# Patient Record
Sex: MALE | Race: WHITE | Employment: FULL TIME | URBAN - NONMETROPOLITAN AREA
[De-identification: names, ages, dates, MRNs, and addresses within clinical notes are randomized per-mention and may not be internally consistent; named-entity substitution may affect disease eponyms.]

---

## 2017-02-07 ENCOUNTER — OFFICE VISIT (OUTPATIENT)
Dept: UROLOGY | Age: 42
End: 2017-02-07

## 2017-02-07 VITALS
WEIGHT: 295 LBS | BODY MASS INDEX: 39.1 KG/M2 | SYSTOLIC BLOOD PRESSURE: 146 MMHG | HEIGHT: 73 IN | DIASTOLIC BLOOD PRESSURE: 88 MMHG

## 2017-02-07 DIAGNOSIS — N20.0 NEPHROLITHIASIS: Primary | ICD-10-CM

## 2017-02-07 LAB
BILIRUBIN URINE: NEGATIVE
BLOOD URINE, POC: NORMAL
CHARACTER, URINE: CLEAR
COLOR, URINE: YELLOW
GLUCOSE URINE: NEGATIVE MG/DL
KETONES, URINE: NEGATIVE
LEUKOCYTE CLUMPS, URINE: NEGATIVE
NITRITE, URINE: NEGATIVE
PH, URINE: 6
PROTEIN, URINE: NEGATIVE MG/DL
SPECIFIC GRAVITY, URINE: 1.02 (ref 1–1.03)
UROBILINOGEN, URINE: 0.2 EU/DL

## 2017-02-07 PROCEDURE — 81003 URINALYSIS AUTO W/O SCOPE: CPT | Performed by: NURSE PRACTITIONER

## 2017-02-07 PROCEDURE — 99203 OFFICE O/P NEW LOW 30 MIN: CPT | Performed by: NURSE PRACTITIONER

## 2017-02-10 LAB — STONE ANALYSIS: NORMAL

## 2017-11-09 ENCOUNTER — OFFICE VISIT (OUTPATIENT)
Dept: CARDIOLOGY CLINIC | Age: 42
End: 2017-11-09
Payer: COMMERCIAL

## 2017-11-09 VITALS
BODY MASS INDEX: 40.42 KG/M2 | DIASTOLIC BLOOD PRESSURE: 88 MMHG | SYSTOLIC BLOOD PRESSURE: 140 MMHG | HEIGHT: 73 IN | WEIGHT: 305 LBS | HEART RATE: 87 BPM

## 2017-11-09 DIAGNOSIS — I48.20 CHRONIC ATRIAL FIBRILLATION (HCC): Primary | ICD-10-CM

## 2017-11-09 DIAGNOSIS — I48.0 PAF (PAROXYSMAL ATRIAL FIBRILLATION) (HCC): ICD-10-CM

## 2017-11-09 PROCEDURE — 99213 OFFICE O/P EST LOW 20 MIN: CPT | Performed by: INTERNAL MEDICINE

## 2017-11-09 PROCEDURE — 93000 ELECTROCARDIOGRAM COMPLETE: CPT | Performed by: INTERNAL MEDICINE

## 2017-11-09 RX ORDER — DILTIAZEM HYDROCHLORIDE 120 MG/1
120 CAPSULE, COATED, EXTENDED RELEASE ORAL 2 TIMES DAILY
Qty: 60 CAPSULE | Refills: 11 | Status: SHIPPED | OUTPATIENT
Start: 2017-11-09 | End: 2019-04-03 | Stop reason: SDUPTHER

## 2017-11-09 RX ORDER — DABIGATRAN ETEXILATE 150 MG/1
150 CAPSULE, COATED PELLETS ORAL 2 TIMES DAILY
Qty: 60 CAPSULE | Refills: 11 | Status: SHIPPED | OUTPATIENT
Start: 2017-11-09 | End: 2018-10-02

## 2017-11-09 NOTE — PROGRESS NOTES
weakness  Are negative   HE ENT negative  HEART all negative  LUNGS all negative   ABDOMEN all negative  GENITAL URINARY all within normal limits  NEUROLOGY all negative  NECK all negative   SKIN all negative  MUSCULOSKELETAL negative  HEMATOLOGICAL negative  PSYCHIATRIC  negative    Vitals:    11/09/17 0956   BP: (!) 148/89   Pulse: 87   Weight: (!) 305 lb (138.3 kg)   Height: 6' 1\" (1.854 m)       EXAMINATION   Gen.  Appearance is reflective of nutritional normal nutrition and well-groomed   Appears  stated age    Carotid the amplitude of  carotid artery  And up stroke are present or diminished and bruits are absent   Thyroid palpation appears to be  Normal    [place]  And evaluated and within normal limits  And size of pupils is normal  HEENT  teeth appears to be symmetrical without poor dentition and gums  and palate appears to be healthy and  head is normal cephalic  Without signs of trauma and eyes are without trauma no conjunctiva and Iids retracting and not retracted ptosis and no ptosis and without  erythematous changes and  Nose is symmetrical  without drainage  and ears are  without tinnitus  and throat is clear   JUGULAR VEINS  are not elevated and tongue is mid line no masses presence and no lee A waves present   LYMPHATICS are without adenopathies at least on exam   CHEST  No biventricular heaves and thrills and no right ventricular heaves and examination without signs of trauma and lesions  HEART not distant and regular rate and rhythm without   gallop signs and and no murmurs and systolic crescendo  Decrescendo  normal s1 and s2 sounds and no s3 and s4 split   Point of maximum intensity of the heartbeat  is at or displaced from the fifth intercostal space  LUNGS no   presence of intercostal retractions and no  use of the accessory muscles with a diaphragmatic movement present and not present pectus and pigeon chest and without wheezes and rhonchi and non diminished in all posterior lungs  and without rales  ABDOMEN abdominal bruit not present  And  No  Presency of  morbid obesity and with no    non distended without organomegaly and no rebound tenderness and bowel sound are present  all quadrants   NEUROLOGY all the cranial nerves are intact and no motor deficits  and no sensory deficits  MUSCULAR SKELETAL without signs of trauma and kyphosis and scoliosis and normal range of motion for all extremities  INTEGUMENTARY without tenting and skin rashes indentation and  dryness  NECK without lymph adenopathy   NEUROPSYCHIATRIC has no anxiety, no mood swings and depression  VASCULAR EXAM for carotid ,radial femoral arteries are  steady  and not diminished   Extremities no evidence of peripheral edema  And pulses are  normal    Assessment and plans  1.  atrial fibrillation (HCC) in sinus  EKG 12 Lead   2. PAF (paroxysmal atrial fibrillation) (HCC) in sinus       ekg sinus no acute process     Patient is on anticoagulation and has accepted the potential risk of bleeding to hopefull decrease risk of embolic stroke. We Re assured  And educated the  patient  On their  medical status  And the treatment plans  And the patient  is willing  to be complaint with care  And follow up and  All their question  answered to their  Satisfaction     patient was advised of the side effects of the medications and watch for any change in medical status if any of those side effects develop to call immediately and may consider  discontinuing the medicine after talking to us and  depending on the clinical scenario    Patient was advised to return in 12 months for follow up or sooner if there is any change in care.           Electronically signed by Denise Malagon DO on 11/9/2017 at 10:33 AM

## 2017-12-24 ENCOUNTER — HOSPITAL ENCOUNTER (EMERGENCY)
Age: 42
Discharge: HOME | End: 2017-12-24
Payer: COMMERCIAL

## 2017-12-24 ENCOUNTER — APPOINTMENT (OUTPATIENT)
Dept: CT IMAGING | Age: 42
End: 2017-12-24
Payer: COMMERCIAL

## 2017-12-24 ENCOUNTER — APPOINTMENT (OUTPATIENT)
Dept: GENERAL RADIOLOGY | Age: 42
End: 2017-12-24
Payer: COMMERCIAL

## 2017-12-24 VITALS
HEIGHT: 73 IN | BODY MASS INDEX: 38.43 KG/M2 | OXYGEN SATURATION: 98 % | HEART RATE: 77 BPM | WEIGHT: 290 LBS | SYSTOLIC BLOOD PRESSURE: 125 MMHG | DIASTOLIC BLOOD PRESSURE: 74 MMHG | RESPIRATION RATE: 16 BRPM | TEMPERATURE: 97.7 F

## 2017-12-24 DIAGNOSIS — K80.50 BILIARY COLIC: Primary | ICD-10-CM

## 2017-12-24 LAB
ALBUMIN SERPL-MCNC: 4.3 G/DL (ref 3.5–5.1)
ALP BLD-CCNC: 75 U/L (ref 38–126)
ALT SERPL-CCNC: 50 U/L (ref 11–66)
ANION GAP SERPL CALCULATED.3IONS-SCNC: 15 MEQ/L (ref 8–16)
AST SERPL-CCNC: 27 U/L (ref 5–40)
BASOPHILS # BLD: 0.5 %
BASOPHILS ABSOLUTE: 0.1 THOU/MM3 (ref 0–0.1)
BILIRUB SERPL-MCNC: 0.3 MG/DL (ref 0.3–1.2)
BILIRUBIN DIRECT: < 0.2 MG/DL (ref 0–0.3)
BUN BLDV-MCNC: 20 MG/DL (ref 7–22)
CALCIUM SERPL-MCNC: 9.9 MG/DL (ref 8.5–10.5)
CHLORIDE BLD-SCNC: 103 MEQ/L (ref 98–111)
CO2: 25 MEQ/L (ref 23–33)
CREAT SERPL-MCNC: 1.3 MG/DL (ref 0.4–1.2)
EKG ATRIAL RATE: 73 BPM
EKG P AXIS: 50 DEGREES
EKG P-R INTERVAL: 142 MS
EKG Q-T INTERVAL: 360 MS
EKG QRS DURATION: 80 MS
EKG QTC CALCULATION (BAZETT): 396 MS
EKG R AXIS: 54 DEGREES
EKG T AXIS: 33 DEGREES
EKG VENTRICULAR RATE: 73 BPM
EOSINOPHIL # BLD: 1.1 %
EOSINOPHILS ABSOLUTE: 0.1 THOU/MM3 (ref 0–0.4)
FLU A ANTIGEN: NEGATIVE
FLU B ANTIGEN: NEGATIVE
GFR SERPL CREATININE-BSD FRML MDRD: 60 ML/MIN/1.73M2
GLUCOSE BLD-MCNC: 147 MG/DL (ref 70–108)
HCT VFR BLD CALC: 50.6 % (ref 42–52)
HEMOGLOBIN: 17.3 GM/DL (ref 14–18)
LIPASE: 22.3 U/L (ref 5.6–51.3)
LYMPHOCYTES # BLD: 15.8 %
LYMPHOCYTES ABSOLUTE: 1.8 THOU/MM3 (ref 1–4.8)
MCH RBC QN AUTO: 32.2 PG (ref 27–31)
MCHC RBC AUTO-ENTMCNC: 34.1 GM/DL (ref 33–37)
MCV RBC AUTO: 94.4 FL (ref 80–94)
MONOCYTES # BLD: 5.8 %
MONOCYTES ABSOLUTE: 0.6 THOU/MM3 (ref 0.4–1.3)
NUCLEATED RED BLOOD CELLS: 0 /100 WBC
OSMOLALITY CALCULATION: 290.3 MOSMOL/KG (ref 275–300)
PDW BLD-RTO: 14.1 % (ref 11.5–14.5)
PLATELET # BLD: 246 THOU/MM3 (ref 130–400)
PMV BLD AUTO: 8.8 MCM (ref 7.4–10.4)
POTASSIUM SERPL-SCNC: 4.9 MEQ/L (ref 3.5–5.2)
RBC # BLD: 5.36 MILL/MM3 (ref 4.7–6.1)
SEG NEUTROPHILS: 76.8 %
SEGMENTED NEUTROPHILS ABSOLUTE COUNT: 8.6 THOU/MM3 (ref 1.8–7.7)
SODIUM BLD-SCNC: 143 MEQ/L (ref 135–145)
TOTAL PROTEIN: 7.3 G/DL (ref 6.1–8)
TROPONIN T: < 0.01 NG/ML
WBC # BLD: 11.2 THOU/MM3 (ref 4.8–10.8)

## 2017-12-24 PROCEDURE — 6360000004 HC RX CONTRAST MEDICATION: Performed by: NURSE PRACTITIONER

## 2017-12-24 PROCEDURE — 96361 HYDRATE IV INFUSION ADD-ON: CPT

## 2017-12-24 PROCEDURE — 2580000003 HC RX 258: Performed by: NURSE PRACTITIONER

## 2017-12-24 PROCEDURE — 74177 CT ABD & PELVIS W/CONTRAST: CPT

## 2017-12-24 PROCEDURE — 71010 XR CHEST PORTABLE: CPT

## 2017-12-24 PROCEDURE — 87804 INFLUENZA ASSAY W/OPTIC: CPT

## 2017-12-24 PROCEDURE — 84484 ASSAY OF TROPONIN QUANT: CPT

## 2017-12-24 PROCEDURE — 93005 ELECTROCARDIOGRAM TRACING: CPT

## 2017-12-24 PROCEDURE — 6370000000 HC RX 637 (ALT 250 FOR IP): Performed by: NURSE PRACTITIONER

## 2017-12-24 PROCEDURE — 96374 THER/PROPH/DIAG INJ IV PUSH: CPT

## 2017-12-24 PROCEDURE — 6360000002 HC RX W HCPCS: Performed by: NURSE PRACTITIONER

## 2017-12-24 PROCEDURE — 6360000002 HC RX W HCPCS

## 2017-12-24 PROCEDURE — 85025 COMPLETE CBC W/AUTO DIFF WBC: CPT

## 2017-12-24 PROCEDURE — 82248 BILIRUBIN DIRECT: CPT

## 2017-12-24 PROCEDURE — 80053 COMPREHEN METABOLIC PANEL: CPT

## 2017-12-24 PROCEDURE — 36415 COLL VENOUS BLD VENIPUNCTURE: CPT

## 2017-12-24 PROCEDURE — 96375 TX/PRO/DX INJ NEW DRUG ADDON: CPT

## 2017-12-24 PROCEDURE — 99285 EMERGENCY DEPT VISIT HI MDM: CPT

## 2017-12-24 PROCEDURE — 83690 ASSAY OF LIPASE: CPT

## 2017-12-24 RX ORDER — KETOROLAC TROMETHAMINE 30 MG/ML
INJECTION, SOLUTION INTRAMUSCULAR; INTRAVENOUS
Status: COMPLETED
Start: 2017-12-24 | End: 2017-12-24

## 2017-12-24 RX ORDER — KETOROLAC TROMETHAMINE 30 MG/ML
30 INJECTION, SOLUTION INTRAMUSCULAR; INTRAVENOUS ONCE
Status: DISCONTINUED | OUTPATIENT
Start: 2017-12-24 | End: 2017-12-24

## 2017-12-24 RX ORDER — ONDANSETRON 2 MG/ML
4 INJECTION INTRAMUSCULAR; INTRAVENOUS ONCE
Status: COMPLETED | OUTPATIENT
Start: 2017-12-24 | End: 2017-12-24

## 2017-12-24 RX ORDER — MORPHINE SULFATE 2 MG/ML
4 INJECTION, SOLUTION INTRAMUSCULAR; INTRAVENOUS ONCE
Status: COMPLETED | OUTPATIENT
Start: 2017-12-24 | End: 2017-12-24

## 2017-12-24 RX ORDER — 0.9 % SODIUM CHLORIDE 0.9 %
1000 INTRAVENOUS SOLUTION INTRAVENOUS ONCE
Status: COMPLETED | OUTPATIENT
Start: 2017-12-24 | End: 2017-12-24

## 2017-12-24 RX ORDER — KETOROLAC TROMETHAMINE 30 MG/ML
30 INJECTION, SOLUTION INTRAMUSCULAR; INTRAVENOUS ONCE
Status: COMPLETED | OUTPATIENT
Start: 2017-12-24 | End: 2017-12-24

## 2017-12-24 RX ORDER — KETOROLAC TROMETHAMINE 10 MG/1
10 TABLET, FILM COATED ORAL EVERY 6 HOURS PRN
Qty: 20 TABLET | Refills: 0 | Status: SHIPPED | OUTPATIENT
Start: 2017-12-24 | End: 2018-09-25

## 2017-12-24 RX ADMIN — KETOROLAC TROMETHAMINE 30 MG: 30 INJECTION, SOLUTION INTRAMUSCULAR at 07:16

## 2017-12-24 RX ADMIN — ONDANSETRON 4 MG: 2 INJECTION INTRAMUSCULAR; INTRAVENOUS at 06:27

## 2017-12-24 RX ADMIN — IOPAMIDOL 80 ML: 755 INJECTION, SOLUTION INTRAVENOUS at 07:50

## 2017-12-24 RX ADMIN — KETOROLAC TROMETHAMINE 30 MG: 30 INJECTION, SOLUTION INTRAMUSCULAR; INTRAVENOUS at 07:16

## 2017-12-24 RX ADMIN — MORPHINE SULFATE 4 MG: 2 INJECTION, SOLUTION INTRAMUSCULAR; INTRAVENOUS at 06:27

## 2017-12-24 RX ADMIN — Medication 15 ML: at 06:27

## 2017-12-24 RX ADMIN — SODIUM CHLORIDE 1000 ML: 9 INJECTION, SOLUTION INTRAVENOUS at 06:31

## 2017-12-24 ASSESSMENT — ENCOUNTER SYMPTOMS
NAUSEA: 0
RHINORRHEA: 0
SHORTNESS OF BREATH: 0
BACK PAIN: 1
WHEEZING: 0
SORE THROAT: 0
VOICE CHANGE: 0
ABDOMINAL DISTENTION: 0
DIARRHEA: 0
COUGH: 0
ABDOMINAL PAIN: 1
EYE REDNESS: 0
PHOTOPHOBIA: 0
BLOOD IN STOOL: 0
VOMITING: 0
SINUS PRESSURE: 0
COLOR CHANGE: 0
CHEST TIGHTNESS: 0
CONSTIPATION: 0

## 2017-12-24 ASSESSMENT — PAIN DESCRIPTION - ONSET: ONSET: ON-GOING

## 2017-12-24 ASSESSMENT — PAIN SCALES - GENERAL
PAINLEVEL_OUTOF10: 10
PAINLEVEL_OUTOF10: 9
PAINLEVEL_OUTOF10: 10

## 2017-12-24 ASSESSMENT — PAIN DESCRIPTION - PROGRESSION: CLINICAL_PROGRESSION: NOT CHANGED

## 2017-12-24 ASSESSMENT — PAIN DESCRIPTION - FREQUENCY: FREQUENCY: CONTINUOUS

## 2017-12-24 ASSESSMENT — PAIN DESCRIPTION - ORIENTATION: ORIENTATION: UPPER

## 2017-12-24 ASSESSMENT — PAIN DESCRIPTION - PAIN TYPE: TYPE: ACUTE PAIN

## 2017-12-24 ASSESSMENT — PAIN DESCRIPTION - LOCATION: LOCATION: ABDOMEN

## 2017-12-24 NOTE — ED PROVIDER NOTES
for photophobia, redness and visual disturbance. Respiratory: Negative for cough, chest tightness, shortness of breath and wheezing. Cardiovascular: Positive for chest pain (mild). Negative for palpitations. Gastrointestinal: Positive for abdominal pain (upper). Negative for abdominal distention, blood in stool, constipation, diarrhea, nausea and vomiting. Endocrine: Negative for cold intolerance, heat intolerance, polydipsia, polyphagia and polyuria. Genitourinary: Negative for decreased urine volume, difficulty urinating, dysuria, flank pain and frequency. Musculoskeletal: Positive for back pain (while laying flat, his abdominal pain radiates to his back). Negative for arthralgias, gait problem, joint swelling, neck pain and neck stiffness. Skin: Negative for color change and rash. Allergic/Immunologic: Negative for immunocompromised state. Neurological: Negative for dizziness, tremors, weakness, light-headedness, numbness and headaches. Hematological: Does not bruise/bleed easily. Psychiatric/Behavioral: Negative for behavioral problems, confusion, decreased concentration, hallucinations, self-injury and suicidal ideas. The patient is not nervous/anxious. PAST MEDICAL HISTORY    has a past medical history of Arthritis; Atrial fibrillation ([facility] [address] CAD (coronary artery disease); Chest pain; Heart palpitations; Hypertension; and Nausea & vomiting. SURGICAL HISTORY      has a past surgical history that includes Foot surgery (Right, 2012) and Cardiac surgery (1/14).     CURRENT MEDICATIONS       Discharge Medication List as of 12/24/2017  9:07 AM      CONTINUE these medications which have NOT CHANGED    Details   dabigatran (PRADAXA) 150 MG capsule Take 1 capsule by mouth 2 times daily, Disp-60 capsule, R-11Normal      diltiazem (CARDIZEM CD) 120 MG extended release capsule Take 1 capsule by mouth 2 times daily, Disp-60 capsule, R-11Normal      aspirin 81 MG tablet Take 81 mg by mouth daily.      fish oil-omega-3 fatty acids 1000 MG capsule Take 2 g by mouth 2 times daily. ALLERGIES     has No Known Allergies. FAMILY HISTORY     indicated that his mother is alive. He indicated that his father is . He indicated that his sister is alive. He indicated that only one of his two brothers is alive. family history includes Heart Disease in his father. SOCIAL HISTORY      reports that he has been smoking Cigarettes. He has a 22.00 pack-year smoking history. He has never used smokeless tobacco. He reports that he does not drink alcohol or use drugs. PHYSICAL EXAM     INITIAL VITALS:  height is 6' 1\" (1.854 m) and weight is 290 lb (131.5 kg). His oral temperature is 97.7 °F (36.5 °C). His blood pressure is 125/74 and his pulse is 77. His respiration is 16 and oxygen saturation is 98%. Physical Exam   Constitutional: He is oriented to person, place, and time. He appears well-developed and well-nourished. HENT:   Head: Normocephalic. Right Ear: External ear normal.   Left Ear: External ear normal.   Nose: Nose normal.   Mouth/Throat: Uvula is midline and oropharynx is clear and moist.   Eyes: Conjunctivae and EOM are normal. Pupils are equal, round, and reactive to light. Neck: Normal range of motion. Neck supple. Cardiovascular: Normal rate, regular rhythm, S1 normal, S2 normal, normal heart sounds, intact distal pulses and normal pulses. Exam reveals no decreased pulses. Pulses:       Radial pulses are 2+ on the right side, and 2+ on the left side. Pulmonary/Chest: Effort normal and breath sounds normal. No respiratory distress. He exhibits no tenderness. Abdominal: Soft. Normal appearance and bowel sounds are normal. He exhibits no distension. There is tenderness. Abdominal tenderness is diffuse, but patient is more tender in the upper abdominal regions. Musculoskeletal: Normal range of motion.    Neurological: He is alert and oriented to person, CREATININE 1.3 (*)     All other components within normal limits   GLOMERULAR FILTRATION RATE, ESTIMATED - Abnormal; Notable for the following:     Est, Glom Filt Rate 60 (*)     All other components within normal limits   RAPID INFLUENZA A/B ANTIGENS   HEPATIC FUNCTION PANEL   LIPASE   TROPONIN   OSMOLALITY   ANION GAP       EMERGENCY DEPARTMENT COURSE:   Vitals:    Vitals:    12/24/17 0555 12/24/17 0710 12/24/17 0904   BP: (!) 149/103 (!) 142/93 125/74   Pulse: 76 73 77   Resp: 16 18 16   Temp: 97.7 °F (36.5 °C)     TempSrc: Oral     SpO2: 97% 97% 98%   Weight: 290 lb (131.5 kg)     Height: 6' 1\" (1.854 m)       6:13 AM: Patient was seen and evaluated. MDM    Medications   gi cocktail 15 mL (15 mLs Oral Given 12/24/17 0627)   0.9 % sodium chloride bolus (0 mLs Intravenous Stopped 12/24/17 0925)   morphine injection 4 mg (4 mg Intravenous Given 12/24/17 0627)   ondansetron (ZOFRAN) injection 4 mg (4 mg Intravenous Given 12/24/17 0627)   ketorolac (TORADOL) injection 30 mg (30 mg Intravenous Given 12/24/17 0716)   iopamidol (ISOVUE-370) 76 % injection 80 mL (80 mLs Intravenous Given 12/24/17 0750)     Patient was seen for epigastric abdominal pain. Vital signs were stable. Upon examination, the patient was diaphoretic and had diffused abdominal tenderness. The patient was more tender in the epigastric abdominal region. Patient was given 15mL gi cocktail, morphine and toradol for pain, and zofran for nausea. Patient had relief from these symptoms. Imaging revealed a nonobstructive right nephrolithiasisa and a subtle gallstone in the neck of the gallbladder, but no other acute findings. Patient was discharged home with a toradol prescription, and was told to follow up with Dr. Niya Fisher, General Surgeon, in a week if his symptoms worsen. Patient will return to ED for any new or worsening symptoms. Patient was seen independently by myself.  The patient's final impression and disposition and plan was determined by myself. CRITICAL CARE:   None    CONSULTS:  None    PROCEDURES:  None    FINAL IMPRESSION      1. Biliary colic          DISPOSITION/PLAN   Discharge. I have given the patient strict written and verbal instructions about care at home, follow-up, and signs and symptoms of worsening of condition and they did verbalize understanding. PATIENT REFERRED TO:  [facility]  [address]  [address]  [phone]    Call in 1 week  If symptoms worsen      DISCHARGE MEDICATIONS:  Discharge Medication List as of 12/24/2017  9:07 AM      START taking these medications    Details   ketorolac (TORADOL) 10 MG tablet Take 1 tablet by mouth every 6 hours as needed for Pain, Disp-20 tablet, R-0Print             (Please note that portions of this note were completed with a voice recognition program.  Efforts were made to edit the dictations but occasionally words are mis-transcribed.)    Scribe:  Will Holland 12/24/17 6:14 AM Scribing for and in the presence of Donaldo Lopez CNP. Signed by: Melissa Turner, 12/24/17 9:43 AM    Provider:  I personally performed the services described in the documentation, reviewed and edited the documentation which was dictated to the scribe in my presence, and it accurately records my words and actions.     Donaldo Lopez CNP 12/24/17 9:43 AM    Adelaida Lopez NP  12/24/17 8525

## 2017-12-24 NOTE — ED NOTES
Kerline Watson CNP in room to discuss results and POC with pt. Denies needs. Call light in reach.      Marielena Knight RN  12/24/17 0110

## 2017-12-24 NOTE — ED TRIAGE NOTES
Patient presents to ED with complaint of uppder abdominal pain. Per patient this started last night about 2230 while he was watching TV. Patient states nothing makes it worse or better. States that it feels like he has to burp but cannot. Denies shortness of breath but states he feels like he is hot.

## 2017-12-28 ENCOUNTER — TELEPHONE (OUTPATIENT)
Dept: SURGERY | Age: 42
End: 2017-12-28

## 2017-12-28 NOTE — TELEPHONE ENCOUNTER
LM asking pt to call office to schedule appointment to see Dr. Lissy Jama in f/u to ER visit for biliary colic

## 2018-09-10 ENCOUNTER — HOSPITAL ENCOUNTER (EMERGENCY)
Age: 43
Discharge: HOME | End: 2018-09-10
Attending: EMERGENCY MEDICINE
Payer: COMMERCIAL

## 2018-09-10 ENCOUNTER — APPOINTMENT (OUTPATIENT)
Dept: CT IMAGING | Age: 43
End: 2018-09-10
Payer: COMMERCIAL

## 2018-09-10 VITALS
HEIGHT: 73 IN | HEART RATE: 64 BPM | BODY MASS INDEX: 39.1 KG/M2 | SYSTOLIC BLOOD PRESSURE: 138 MMHG | OXYGEN SATURATION: 97 % | DIASTOLIC BLOOD PRESSURE: 90 MMHG | RESPIRATION RATE: 18 BRPM | WEIGHT: 295 LBS | TEMPERATURE: 97.5 F

## 2018-09-10 DIAGNOSIS — R03.0 ELEVATED BLOOD PRESSURE READING: ICD-10-CM

## 2018-09-10 DIAGNOSIS — K80.50 BILIARY COLIC: Primary | ICD-10-CM

## 2018-09-10 LAB
ALBUMIN SERPL-MCNC: 4.6 G/DL (ref 3.5–5.1)
ALP BLD-CCNC: 79 U/L (ref 38–126)
ALT SERPL-CCNC: 22 U/L (ref 11–66)
ANION GAP SERPL CALCULATED.3IONS-SCNC: 11 MEQ/L (ref 8–16)
APTT: 35.7 SECONDS (ref 22–38)
AST SERPL-CCNC: 18 U/L (ref 5–40)
BASOPHILS # BLD: 0.5 %
BASOPHILS ABSOLUTE: 0.1 THOU/MM3 (ref 0–0.1)
BILIRUB SERPL-MCNC: 0.3 MG/DL (ref 0.3–1.2)
BUN BLDV-MCNC: 21 MG/DL (ref 7–22)
CALCIUM SERPL-MCNC: 9.6 MG/DL (ref 8.5–10.5)
CHLORIDE BLD-SCNC: 104 MEQ/L (ref 98–111)
CO2: 26 MEQ/L (ref 23–33)
CREAT SERPL-MCNC: 1.5 MG/DL (ref 0.4–1.2)
EKG ATRIAL RATE: 72 BPM
EKG P AXIS: 52 DEGREES
EKG P-R INTERVAL: 144 MS
EKG Q-T INTERVAL: 388 MS
EKG QRS DURATION: 82 MS
EKG QTC CALCULATION (BAZETT): 424 MS
EKG R AXIS: 59 DEGREES
EKG T AXIS: 38 DEGREES
EKG VENTRICULAR RATE: 72 BPM
EOSINOPHIL # BLD: 1.6 %
EOSINOPHILS ABSOLUTE: 0.2 THOU/MM3 (ref 0–0.4)
ERYTHROCYTE [DISTWIDTH] IN BLOOD BY AUTOMATED COUNT: 13.2 % (ref 11.5–14.5)
ERYTHROCYTE [DISTWIDTH] IN BLOOD BY AUTOMATED COUNT: 45.1 FL (ref 35–45)
GFR SERPL CREATININE-BSD FRML MDRD: 51 ML/MIN/1.73M2
GLUCOSE BLD-MCNC: 146 MG/DL (ref 70–108)
HCT VFR BLD CALC: 50.9 % (ref 42–52)
HEMOGLOBIN: 17.4 GM/DL (ref 14–18)
IMMATURE GRANS (ABS): 0.04 THOU/MM3 (ref 0–0.07)
IMMATURE GRANULOCYTES: 0.4 %
INR BLD: 0.96 (ref 0.85–1.13)
LIPASE: 25.4 U/L (ref 5.6–51.3)
LYMPHOCYTES # BLD: 17.4 %
LYMPHOCYTES ABSOLUTE: 1.9 THOU/MM3 (ref 1–4.8)
MCH RBC QN AUTO: 32.2 PG (ref 26–33)
MCHC RBC AUTO-ENTMCNC: 34.2 GM/DL (ref 32.2–35.5)
MCV RBC AUTO: 94.1 FL (ref 80–94)
MONOCYTES # BLD: 7.2 %
MONOCYTES ABSOLUTE: 0.8 THOU/MM3 (ref 0.4–1.3)
NUCLEATED RED BLOOD CELLS: 0 /100 WBC
OSMOLALITY CALCULATION: 286.9 MOSMOL/KG (ref 275–300)
PLATELET # BLD: 257 THOU/MM3 (ref 130–400)
PMV BLD AUTO: 10.2 FL (ref 9.4–12.4)
POTASSIUM SERPL-SCNC: 4.6 MEQ/L (ref 3.5–5.2)
PRO-BNP: 38.2 PG/ML (ref 0–450)
RBC # BLD: 5.41 MILL/MM3 (ref 4.7–6.1)
SEG NEUTROPHILS: 72.9 %
SEGMENTED NEUTROPHILS ABSOLUTE COUNT: 8.1 THOU/MM3 (ref 1.8–7.7)
SODIUM BLD-SCNC: 141 MEQ/L (ref 135–145)
TOTAL PROTEIN: 7.3 G/DL (ref 6.1–8)
TROPONIN T: < 0.01 NG/ML
WBC # BLD: 11.1 THOU/MM3 (ref 4.8–10.8)

## 2018-09-10 PROCEDURE — 85610 PROTHROMBIN TIME: CPT

## 2018-09-10 PROCEDURE — 83880 ASSAY OF NATRIURETIC PEPTIDE: CPT

## 2018-09-10 PROCEDURE — 93005 ELECTROCARDIOGRAM TRACING: CPT | Performed by: EMERGENCY MEDICINE

## 2018-09-10 PROCEDURE — 93010 ELECTROCARDIOGRAM REPORT: CPT | Performed by: INTERNAL MEDICINE

## 2018-09-10 PROCEDURE — 36415 COLL VENOUS BLD VENIPUNCTURE: CPT

## 2018-09-10 PROCEDURE — 83690 ASSAY OF LIPASE: CPT

## 2018-09-10 PROCEDURE — 71275 CT ANGIOGRAPHY CHEST: CPT

## 2018-09-10 PROCEDURE — 84484 ASSAY OF TROPONIN QUANT: CPT

## 2018-09-10 PROCEDURE — 74177 CT ABD & PELVIS W/CONTRAST: CPT

## 2018-09-10 PROCEDURE — 2580000003 HC RX 258: Performed by: EMERGENCY MEDICINE

## 2018-09-10 PROCEDURE — 6360000004 HC RX CONTRAST MEDICATION: Performed by: EMERGENCY MEDICINE

## 2018-09-10 PROCEDURE — 85025 COMPLETE CBC W/AUTO DIFF WBC: CPT

## 2018-09-10 PROCEDURE — 96376 TX/PRO/DX INJ SAME DRUG ADON: CPT

## 2018-09-10 PROCEDURE — 85730 THROMBOPLASTIN TIME PARTIAL: CPT

## 2018-09-10 PROCEDURE — 80053 COMPREHEN METABOLIC PANEL: CPT

## 2018-09-10 PROCEDURE — 96374 THER/PROPH/DIAG INJ IV PUSH: CPT

## 2018-09-10 PROCEDURE — 6360000002 HC RX W HCPCS: Performed by: EMERGENCY MEDICINE

## 2018-09-10 PROCEDURE — 99284 EMERGENCY DEPT VISIT MOD MDM: CPT

## 2018-09-10 RX ORDER — MORPHINE SULFATE 2 MG/ML
2 INJECTION, SOLUTION INTRAMUSCULAR; INTRAVENOUS ONCE
Status: COMPLETED | OUTPATIENT
Start: 2018-09-10 | End: 2018-09-10

## 2018-09-10 RX ORDER — 0.9 % SODIUM CHLORIDE 0.9 %
1000 INTRAVENOUS SOLUTION INTRAVENOUS ONCE
Status: COMPLETED | OUTPATIENT
Start: 2018-09-10 | End: 2018-09-10

## 2018-09-10 RX ORDER — HYDROCODONE BITARTRATE AND ACETAMINOPHEN 5; 325 MG/1; MG/1
1 TABLET ORAL EVERY 8 HOURS PRN
Qty: 12 TABLET | Refills: 0 | Status: SHIPPED | OUTPATIENT
Start: 2018-09-10 | End: 2018-09-15

## 2018-09-10 RX ORDER — MORPHINE SULFATE 2 MG/ML
INJECTION, SOLUTION INTRAMUSCULAR; INTRAVENOUS
Status: DISCONTINUED
Start: 2018-09-10 | End: 2018-09-10 | Stop reason: HOSPADM

## 2018-09-10 RX ADMIN — SODIUM CHLORIDE 1000 ML: 9 INJECTION, SOLUTION INTRAVENOUS at 05:55

## 2018-09-10 RX ADMIN — MORPHINE SULFATE 2 MG: 2 INJECTION, SOLUTION INTRAMUSCULAR; INTRAVENOUS at 05:07

## 2018-09-10 RX ADMIN — IOPAMIDOL 80 ML: 755 INJECTION, SOLUTION INTRAVENOUS at 05:18

## 2018-09-10 RX ADMIN — MORPHINE SULFATE 2 MG: 2 INJECTION, SOLUTION INTRAMUSCULAR; INTRAVENOUS at 06:09

## 2018-09-10 ASSESSMENT — PAIN DESCRIPTION - LOCATION: LOCATION: ABDOMEN;BACK

## 2018-09-10 ASSESSMENT — ENCOUNTER SYMPTOMS
BACK PAIN: 1
VOMITING: 0
WHEEZING: 0
NAUSEA: 0
ABDOMINAL PAIN: 1
BLOOD IN STOOL: 0
CONSTIPATION: 0
DIARRHEA: 0
RHINORRHEA: 0
SORE THROAT: 0
COUGH: 0
CHEST TIGHTNESS: 0
SHORTNESS OF BREATH: 0

## 2018-09-10 ASSESSMENT — PAIN SCALES - GENERAL
PAINLEVEL_OUTOF10: 8
PAINLEVEL_OUTOF10: 8
PAINLEVEL_OUTOF10: 6

## 2018-09-10 ASSESSMENT — PAIN DESCRIPTION - FREQUENCY: FREQUENCY: CONTINUOUS

## 2018-09-10 ASSESSMENT — PAIN DESCRIPTION - PAIN TYPE: TYPE: ACUTE PAIN

## 2018-09-10 ASSESSMENT — PAIN DESCRIPTION - DESCRIPTORS: DESCRIPTORS: CONSTANT

## 2018-09-10 NOTE — ED PROVIDER NOTES
Hematological: Negative for adenopathy. Psychiatric/Behavioral: Negative for confusion and suicidal ideas. The patient is not nervous/anxious. PAST MEDICAL HISTORY    has a past medical history of Arthritis; Atrial fibrillation ([address] CAD (coronary artery disease); Chest pain; Heart palpitations; Hypertension; and Nausea & vomiting. SURGICAL HISTORY      has a past surgical history that includes Foot surgery (Right, ) and Cardiac surgery (). CURRENT MEDICATIONS       Discharge Medication List as of 9/10/2018  6:28 AM      CONTINUE these medications which have NOT CHANGED    Details   ketorolac (TORADOL) 10 MG tablet Take 1 tablet by mouth every 6 hours as needed for Pain, Disp-20 tablet, R-0Print      dabigatran (PRADAXA) 150 MG capsule Take 1 capsule by mouth 2 times daily, Disp-60 capsule, R-11Normal      diltiazem (CARDIZEM CD) 120 MG extended release capsule Take 1 capsule by mouth 2 times daily, Disp-60 capsule, R-11Normal      aspirin 81 MG tablet Take 81 mg by mouth daily. fish oil-omega-3 fatty acids 1000 MG capsule Take 2 g by mouth 2 times daily. ALLERGIES     has No Known Allergies. FAMILY HISTORY     indicated that his mother is alive. He indicated that his father is . He indicated that his sister is alive. He indicated that only one of his two brothers is alive. family history includes Heart Disease in his father. SOCIAL HISTORY      reports that he has been smoking Cigarettes. He has a 22.00 pack-year smoking history. He has never used smokeless tobacco. He reports that he does not drink alcohol or use drugs. PHYSICAL EXAM     INITIAL VITALS:  height is 6' 1\" (1.854 m) and weight is 295 lb (133.8 kg). His oral temperature is 97.5 °F (36.4 °C). His blood pressure is 138/90 (abnormal) and his pulse is 64. His respiration is 18 and oxygen saturation is 97%. Physical Exam   Constitutional: He is oriented to person, place, and time.  He ischemic changes. His labs are reviewed which are reassuring. Normal LFT. Normal lipase. His pain is controlled with IV morphine. CT abdomen and pelvis shows distended gallbladder. Previously noted subtle gallstone in the gallbladder neck not appreciated at this time. Consider right upper quadrant ultrasound correlation. Given his normal LFT, Bilirubin and normal alkaline phosphatase, No suspicion this is asending cholangitis. This patient has known history of cholelithiasis, I feel he needs a HIDA scan instead GB ultrasound. He is discharged with [place]. An outpatient HID scan is ordered. CRITICAL CARE:   None    CONSULTS:  None    PROCEDURES:  None    FINAL IMPRESSION      1. Biliary colic    2. Elevated blood pressure reading          DISPOSITION/PLAN   Home    PATIENT REFERRED TO:  [place] [address], [address] [facility]  [address] [facility] [address] [phone]    In 1 week        DISCHARGE MEDICATIONS:  Discharge Medication List as of 9/10/2018  6:28 AM      START taking these medications    Details   HYDROcodone-acetaminophen (NORCO) 5-325 MG per tablet Take 1 tablet by mouth every 8 hours as needed for Pain for up to 5 days. ., Disp-12 tablet, R-0Print             (Please note that portions of this note were completed with a voice recognition program.  Efforts were made to edit the dictations but occasionally words are mis-transcribed.)    Scribe:  Luz Patterson, 9/10/18 8:01 AM Scribing for and in the presence of No att. providers found.     Signed by: Melissa Desouza, 9/10/18 8:01 AM     Provider:  I personally performed the services described in the documentation, reviewed and edited the documentation which was dictated to the scribe in my presence, and it accurately records my words and actions.     MD Gerald Gallegos MD  09/10/18 7063

## 2018-09-25 ENCOUNTER — TELEPHONE (OUTPATIENT)
Dept: SURGERY | Age: 43
End: 2018-09-25

## 2018-09-25 ENCOUNTER — TELEPHONE (OUTPATIENT)
Dept: CARDIOLOGY CLINIC | Age: 43
End: 2018-09-25

## 2018-09-25 ENCOUNTER — OFFICE VISIT (OUTPATIENT)
Dept: SURGERY | Age: 43
End: 2018-09-25
Payer: COMMERCIAL

## 2018-09-25 VITALS
DIASTOLIC BLOOD PRESSURE: 100 MMHG | OXYGEN SATURATION: 98 % | RESPIRATION RATE: 18 BRPM | TEMPERATURE: 97.1 F | BODY MASS INDEX: 37.77 KG/M2 | HEIGHT: 73 IN | WEIGHT: 285 LBS | HEART RATE: 92 BPM | SYSTOLIC BLOOD PRESSURE: 148 MMHG

## 2018-09-25 DIAGNOSIS — K80.50 BILIARY COLIC: Primary | ICD-10-CM

## 2018-09-25 PROCEDURE — 99204 OFFICE O/P NEW MOD 45 MIN: CPT | Performed by: SURGERY

## 2018-09-25 NOTE — TELEPHONE ENCOUNTER
Patient was established patient of Dr De Montalvo and has not seen anyone else yet, but scheduled w/ Dr Shannan Recinos in November that needs to be rescheduled. He is needing to have a pre-op clearance done for Gallbladder surgery with Dr Jacquelin Mallory.   No date yet

## 2018-09-26 ENCOUNTER — TELEPHONE (OUTPATIENT)
Dept: SURGERY | Age: 43
End: 2018-09-26

## 2018-09-26 ASSESSMENT — ENCOUNTER SYMPTOMS
EYE REDNESS: 0
BACK PAIN: 0
ALLERGIC/IMMUNOLOGIC NEGATIVE: 1
RHINORRHEA: 0
EYE PAIN: 0
SHORTNESS OF BREATH: 0
APNEA: 1
SINUS PAIN: 0
COUGH: 0
CHEST TIGHTNESS: 0
PHOTOPHOBIA: 0
ABDOMINAL PAIN: 1
VOICE CHANGE: 0
EYES NEGATIVE: 1
WHEEZING: 0
COLOR CHANGE: 0
FACIAL SWELLING: 0
CHOKING: 0
STRIDOR: 0
SORE THROAT: 0
EYE DISCHARGE: 0
EYE ITCHING: 0
SINUS PRESSURE: 0
TROUBLE SWALLOWING: 0

## 2018-09-26 NOTE — PROGRESS NOTES
[address] [facility] [address] [place]  [place] [address]  Dept: [phone]  Dept Fax: [phone]  Loc: [phone]    Visit Date: 9/25/2018    Roberto Wilks is a 37 y.o. male who presents today for:  Chief Complaint   Patient presents with    Surgical Consult     New Pt. Ref. Benita Chowdhury- Biliary Colic       HPI:     HPI 75-year-old white male somewhat of a difficult historian but basically in December of last year had what sounds like a biliary colic attack and was seen in the emergency room and had a CT scan performed. The reading on that CT was that there was a \"subtle\" gallstone in the neck of the gallbladder. Patient is unclear as to if any recommendation was given for cholecystectomy within the patient denies any other symptoms until recently had another attack. He presented to the emergency room again had a CT scan performed that showed no other significant abnormalities and now the stone that was seen previously could not be seen. Patient initially states they did an ultrasound but on further questioning in search there is no evidence of ultrasound in radiology but I believe the ER physician used the ultrasound machine in the ER and patient states he could not determine definitive gallstones.   Patient has been scheduled for a HIDA scan but also then was sent for my evaluation patient does have a history of coronary artery disease and a history of atrial fibrillation patient does take Pradaxa but apparently quit on his own earlier this year    Past Medical History:   Diagnosis Date    Arthritis     Atrial fibrillation ([address]     CAD (coronary artery disease)     Chest pain     Heart palpitations     Hypertension     Nausea & vomiting     Sleep apnea     wears cpap      Past Surgical History:   Procedure Laterality Date    CARDIAC [place]GERY  1[place] [address] [facility] FOOT SURGERY Right 2012     Family History   Problem and oriented to person, place, and time. Skin: Skin is warm and dry. No rash noted. No erythema. No pallor. Psychiatric: He has a normal mood and affect.  His behavior is normal. Judgment and thought content normal.       Results for orders placed or performed during the hospital encounter of 09/10/18   CBC Auto Differential   Result Value Ref Range    WBC 11.1 (H) 4.8 - 10.8 thou/mm3    RBC 5.41 4.70 - 6.10 mill/mm3    Hemoglobin 17.4 14.0 - 18.0 gm/dl    Hematocrit 50.9 42.0 - 52.0 %    MCV 94.1 (H) 80.0 - 94.0 fL    MCH 32.2 26.0 - 33.0 pg    MCHC 34.2 32.2 - 35.5 gm/dl    RDW-CV 13.2 11.5 - 14.5 %    RDW-SD 45.1 (H) 35.0 - 45.0 fL    Platelets 041 621 - 496 thou/mm3    MPV 10.2 9.4 - 12.4 fL    Seg Neutrophils 72.9 %    Lymphocytes 17.4 %    Monocytes 7.2 %    Eosinophils 1.6 %    Basophils 0.5 %    Immature Granulocytes 0.4 %    Segs Absolute 8.1 (H) 1.8 - 7.7 thou/mm3    Lymphocytes # 1.9 1.0 - 4.8 thou/mm3    Monocytes # 0.8 0.4 - 1.3 thou/mm3    Eosinophils # 0.2 0.0 - 0.4 thou/mm3    Basophils # 0.1 0.0 - 0.1 thou/mm3    Immature Grans (Abs) 0.04 0.00 - 0.07 thou/mm3    nRBC 0 /100 wbc   Lipase   Result Value Ref Range    Lipase 25.4 5.6 - 51.3 U/L   Comprehensive Metabolic Panel   Result Value Ref Range    Glucose 146 (H) 70 - 108 mg/dL    CREATININE 1.5 (H) 0.4 - 1.2 mg/dL    BUN 21 7 - 22 mg/dL    Sodium 141 135 - 145 meq/L    Potassium 4.6 3.5 - 5.2 meq/L    Chloride 104 98 - 111 meq/L    CO2 26 23 - 33 meq/L    Calcium 9.6 8.5 - 10.5 mg/dL    AST 18 5 - 40 U/L    Alkaline Phosphatase 79 38 - 126 U/L    Total Protein 7.3 6.1 - 8.0 g/dL    Alb 4.6 3.5 - 5.1 g/dL    Total Bilirubin 0.3 0.3 - 1.2 mg/dL    ALT 22 11 - 66 U/L   APTT   Result Value Ref Range    aPTT 35.7 22.0 - 38.0 seconds   Protime-INR   Result Value Ref Range    INR 0.96 0.85 - 1.13   Troponin   Result Value Ref Range    Troponin T < 0.010 ng/ml   Brain Natriuretic Peptide   Result Value Ref Range    Pro-BNP 38.2 0.0 - 450.0 pg/mL Anion Gap   Result Value Ref Range    Anion Gap 11.0 8.0 - 16.0 meq/L   Glomerular Filtration Rate, Estimated   Result Value Ref Range    Est, Glom Filt Rate 51 (A) ml/min/1.73m2   Osmolality   Result Value Ref Range    Osmolality Calc 286.9 275.0 - 300 mOsmol/kg   EKG 12 Lead   Result Value Ref Range    Ventricular Rate 72 BPM    Atrial Rate 72 BPM    P-R Interval 144 ms    QRS Duration 82 ms    Q-T Interval 388 ms    QTc Calculation (Bazett) 424 ms    P Axis 52 degrees    R Axis 59 degrees    T Axis 38 degrees       Assessment:     Patient has had what sounds like fairly classic biliary colic attacks we discussed gallbladder disease with the patient and the symptoms including epigastric right upper quadrant pain and nausea exactly what he has. We discussed the CT findings of 1 year ago and reviewed the CT just recently performed. Certainly is possible that he passed the stone which may have caused the above episode we discussed performing a true ultrasound and if negative HIDA scan. However patient would like to proceed with cholecystectomy and I believe that's appropriate we discussed the laparoscopic procedure and the possible need for open including vascular injury and bile duct injury he would like to proceed    Plan:     1. Schedule Jorge for laparoscopic cholecystectomy possible open  2. The risks, benefits and alternatives were discussed with Jorge. He understands and wishes to proceed with surgical intervention. 3. Restrictions discussed with Jorge and he expresses understanding. 4. He is advised to call back directly if there are further questions/concerns, or if his symptoms worsen prior to surgery.             Electronically signed by Jt Lyons MD on 9/26/2018 at 7:25 AM

## 2018-10-01 ENCOUNTER — APPOINTMENT (OUTPATIENT)
Dept: GENERAL RADIOLOGY | Age: 43
End: 2018-10-01
Payer: COMMERCIAL

## 2018-10-01 ENCOUNTER — APPOINTMENT (OUTPATIENT)
Dept: ULTRASOUND IMAGING | Age: 43
End: 2018-10-01
Payer: COMMERCIAL

## 2018-10-01 ENCOUNTER — HOSPITAL ENCOUNTER (EMERGENCY)
Age: 43
Discharge: HOME | End: 2018-10-01
Payer: COMMERCIAL

## 2018-10-01 VITALS
HEART RATE: 77 BPM | WEIGHT: 285 LBS | SYSTOLIC BLOOD PRESSURE: 140 MMHG | DIASTOLIC BLOOD PRESSURE: 84 MMHG | HEIGHT: 73 IN | TEMPERATURE: 98.7 F | RESPIRATION RATE: 14 BRPM | OXYGEN SATURATION: 95 % | BODY MASS INDEX: 37.77 KG/M2

## 2018-10-01 DIAGNOSIS — R10.31 RIGHT LOWER QUADRANT ABDOMINAL PAIN: Primary | ICD-10-CM

## 2018-10-01 DIAGNOSIS — R31.29 OTHER MICROSCOPIC HEMATURIA: ICD-10-CM

## 2018-10-01 DIAGNOSIS — K59.00 CONSTIPATION, UNSPECIFIED CONSTIPATION TYPE: ICD-10-CM

## 2018-10-01 LAB
ALBUMIN SERPL-MCNC: 4.5 G/DL (ref 3.5–5.1)
ALP BLD-CCNC: 77 U/L (ref 38–126)
ALT SERPL-CCNC: 28 U/L (ref 11–66)
ANION GAP SERPL CALCULATED.3IONS-SCNC: 15 MEQ/L (ref 8–16)
AST SERPL-CCNC: 24 U/L (ref 5–40)
BACTERIA: ABNORMAL /HPF
BASOPHILS # BLD: 0.3 %
BASOPHILS ABSOLUTE: 0 THOU/MM3 (ref 0–0.1)
BILIRUB SERPL-MCNC: 0.4 MG/DL (ref 0.3–1.2)
BILIRUBIN DIRECT: < 0.2 MG/DL (ref 0–0.3)
BILIRUBIN URINE: NEGATIVE
BLOOD, URINE: ABNORMAL
BUN BLDV-MCNC: 23 MG/DL (ref 7–22)
C-REACTIVE PROTEIN: 0.47 MG/DL (ref 0–1)
CALCIUM SERPL-MCNC: 9.9 MG/DL (ref 8.5–10.5)
CASTS 2: ABNORMAL /LPF
CASTS UA: ABNORMAL /LPF
CHARACTER, URINE: CLEAR
CHLORIDE BLD-SCNC: 101 MEQ/L (ref 98–111)
CO2: 22 MEQ/L (ref 23–33)
COLOR: YELLOW
CREAT SERPL-MCNC: 1.4 MG/DL (ref 0.4–1.2)
CRYSTALS, UA: ABNORMAL
EKG ATRIAL RATE: 71 BPM
EKG P AXIS: 71 DEGREES
EKG P-R INTERVAL: 130 MS
EKG Q-T INTERVAL: 370 MS
EKG QRS DURATION: 88 MS
EKG QTC CALCULATION (BAZETT): 402 MS
EKG R AXIS: 128 DEGREES
EKG T AXIS: 86 DEGREES
EKG VENTRICULAR RATE: 71 BPM
EOSINOPHIL # BLD: 0 %
EOSINOPHILS ABSOLUTE: 0 THOU/MM3 (ref 0–0.4)
EPITHELIAL CELLS, UA: ABNORMAL /HPF
ERYTHROCYTE [DISTWIDTH] IN BLOOD BY AUTOMATED COUNT: 12.8 % (ref 11.5–14.5)
ERYTHROCYTE [DISTWIDTH] IN BLOOD BY AUTOMATED COUNT: 44.2 FL (ref 35–45)
GFR SERPL CREATININE-BSD FRML MDRD: 55 ML/MIN/1.73M2
GLUCOSE BLD-MCNC: 132 MG/DL (ref 70–108)
GLUCOSE URINE: NEGATIVE MG/DL
HCT VFR BLD CALC: 50.5 % (ref 42–52)
HEMOGLOBIN: 17.4 GM/DL (ref 14–18)
IMMATURE GRANS (ABS): 0.05 THOU/MM3 (ref 0–0.07)
IMMATURE GRANULOCYTES: 0.4 %
KETONES, URINE: NEGATIVE
LEUKOCYTE ESTERASE, URINE: NEGATIVE
LIPASE: 19.8 U/L (ref 5.6–51.3)
LYMPHOCYTES # BLD: 7.4 %
LYMPHOCYTES ABSOLUTE: 1 THOU/MM3 (ref 1–4.8)
MCH RBC QN AUTO: 32.6 PG (ref 26–33)
MCHC RBC AUTO-ENTMCNC: 34.5 GM/DL (ref 32.2–35.5)
MCV RBC AUTO: 94.7 FL (ref 80–94)
MISCELLANEOUS 2: ABNORMAL
MONOCYTES # BLD: 4.6 %
MONOCYTES ABSOLUTE: 0.6 THOU/MM3 (ref 0.4–1.3)
NITRITE, URINE: NEGATIVE
NUCLEATED RED BLOOD CELLS: 0 /100 WBC
OSMOLALITY CALCULATION: 281.2 MOSMOL/KG (ref 275–300)
PH UA: 5.5
PLATELET # BLD: 237 THOU/MM3 (ref 130–400)
PMV BLD AUTO: 9.9 FL (ref 9.4–12.4)
POTASSIUM SERPL-SCNC: 5.3 MEQ/L (ref 3.5–5.2)
PROTEIN UA: NEGATIVE
RBC # BLD: 5.33 MILL/MM3 (ref 4.7–6.1)
RBC URINE: ABNORMAL /HPF
RENAL EPITHELIAL, UA: ABNORMAL
SEG NEUTROPHILS: 87.3 %
SEGMENTED NEUTROPHILS ABSOLUTE COUNT: 12.2 THOU/MM3 (ref 1.8–7.7)
SODIUM BLD-SCNC: 138 MEQ/L (ref 135–145)
SPECIFIC GRAVITY, URINE: 1.02 (ref 1–1.03)
TOTAL PROTEIN: 7.7 G/DL (ref 6.1–8)
UROBILINOGEN, URINE: 0.2 EU/DL
WBC # BLD: 14 THOU/MM3 (ref 4.8–10.8)
WBC UA: ABNORMAL /HPF
YEAST: ABNORMAL

## 2018-10-01 PROCEDURE — 86140 C-REACTIVE PROTEIN: CPT

## 2018-10-01 PROCEDURE — 6360000002 HC RX W HCPCS: Performed by: PHYSICIAN ASSISTANT

## 2018-10-01 PROCEDURE — 76705 ECHO EXAM OF ABDOMEN: CPT

## 2018-10-01 PROCEDURE — 2580000003 HC RX 258: Performed by: PHYSICIAN ASSISTANT

## 2018-10-01 PROCEDURE — 85025 COMPLETE CBC W/AUTO DIFF WBC: CPT

## 2018-10-01 PROCEDURE — 76770 US EXAM ABDO BACK WALL COMP: CPT

## 2018-10-01 PROCEDURE — 99284 EMERGENCY DEPT VISIT MOD MDM: CPT

## 2018-10-01 PROCEDURE — 80053 COMPREHEN METABOLIC PANEL: CPT

## 2018-10-01 PROCEDURE — 81001 URINALYSIS AUTO W/SCOPE: CPT

## 2018-10-01 PROCEDURE — 96375 TX/PRO/DX INJ NEW DRUG ADDON: CPT

## 2018-10-01 PROCEDURE — 36415 COLL VENOUS BLD VENIPUNCTURE: CPT

## 2018-10-01 PROCEDURE — 82248 BILIRUBIN DIRECT: CPT

## 2018-10-01 PROCEDURE — 83690 ASSAY OF LIPASE: CPT

## 2018-10-01 PROCEDURE — 74018 RADEX ABDOMEN 1 VIEW: CPT

## 2018-10-01 PROCEDURE — 93005 ELECTROCARDIOGRAM TRACING: CPT | Performed by: PHYSICIAN ASSISTANT

## 2018-10-01 PROCEDURE — 96374 THER/PROPH/DIAG INJ IV PUSH: CPT

## 2018-10-01 RX ORDER — ONDANSETRON 4 MG/1
4 TABLET, ORALLY DISINTEGRATING ORAL EVERY 8 HOURS PRN
Qty: 10 TABLET | Refills: 0 | Status: SHIPPED | OUTPATIENT
Start: 2018-10-01 | End: 2019-04-03

## 2018-10-01 RX ORDER — ONDANSETRON 2 MG/ML
4 INJECTION INTRAMUSCULAR; INTRAVENOUS ONCE
Status: COMPLETED | OUTPATIENT
Start: 2018-10-01 | End: 2018-10-01

## 2018-10-01 RX ORDER — KETOROLAC TROMETHAMINE 30 MG/ML
30 INJECTION, SOLUTION INTRAMUSCULAR; INTRAVENOUS ONCE
Status: COMPLETED | OUTPATIENT
Start: 2018-10-01 | End: 2018-10-01

## 2018-10-01 RX ORDER — 0.9 % SODIUM CHLORIDE 0.9 %
1000 INTRAVENOUS SOLUTION INTRAVENOUS ONCE
Status: COMPLETED | OUTPATIENT
Start: 2018-10-01 | End: 2018-10-01

## 2018-10-01 RX ORDER — KETOROLAC TROMETHAMINE 10 MG/1
10 TABLET, FILM COATED ORAL EVERY 6 HOURS PRN
Qty: 15 TABLET | Refills: 0 | Status: SHIPPED | OUTPATIENT
Start: 2018-10-01 | End: 2019-04-03

## 2018-10-01 RX ADMIN — SODIUM CHLORIDE 1000 ML: 9 INJECTION, SOLUTION INTRAVENOUS at 17:00

## 2018-10-01 RX ADMIN — ONDANSETRON HYDROCHLORIDE 4 MG: 4 INJECTION, SOLUTION INTRAMUSCULAR; INTRAVENOUS at 17:00

## 2018-10-01 RX ADMIN — KETOROLAC TROMETHAMINE 30 MG: 30 INJECTION, SOLUTION INTRAMUSCULAR at 17:00

## 2018-10-01 ASSESSMENT — ENCOUNTER SYMPTOMS
DIARRHEA: 1
SHORTNESS OF BREATH: 0
NAUSEA: 1
SORE THROAT: 0
PHOTOPHOBIA: 0
BACK PAIN: 0
COUGH: 0
RHINORRHEA: 0
ABDOMINAL PAIN: 1
VOMITING: 1

## 2018-10-01 ASSESSMENT — PAIN SCALES - GENERAL
PAINLEVEL_OUTOF10: 7
PAINLEVEL_OUTOF10: 7

## 2018-10-01 ASSESSMENT — PAIN DESCRIPTION - LOCATION: LOCATION: ABDOMEN

## 2018-10-01 ASSESSMENT — PAIN DESCRIPTION - PAIN TYPE: TYPE: ACUTE PAIN

## 2018-10-01 ASSESSMENT — PAIN DESCRIPTION - DESCRIPTORS: DESCRIPTORS: CRAMPING

## 2018-10-01 ASSESSMENT — PAIN DESCRIPTION - ORIENTATION: ORIENTATION: RIGHT;LOWER;UPPER

## 2018-10-01 ASSESSMENT — PAIN DESCRIPTION - FREQUENCY: FREQUENCY: CONTINUOUS

## 2018-10-01 NOTE — ED PROVIDER NOTES
Negative for polyuria. Genitourinary: Negative for difficulty urinating, dysuria, flank pain and frequency. Musculoskeletal: Negative for back pain and gait problem. Skin: Negative for rash. Neurological: Negative for dizziness, weakness and numbness. Psychiatric/Behavioral: Negative for confusion and sleep disturbance. PAST MEDICAL HISTORY    has a past medical history of Arthritis; Atrial fibrillation ([address] CAD (coronary artery disease); Chest pain; Heart palpitations; Hypertension; Nausea & vomiting; and Sleep apnea. SURGICAL HISTORY      has a past surgical history that includes Foot surgery (Right, ) and Cardiac surgery (). CURRENT MEDICATIONS       Discharge Medication List as of 10/1/2018  7:15 PM      CONTINUE these medications which have NOT CHANGED    Details   diltiazem (CARDIZEM CD) 120 MG extended release capsule Take 1 capsule by mouth 2 times daily, Disp-60 capsule, R-11Normal      dabigatran (PRADAXA) 150 MG capsule Take 1 capsule by mouth 2 times daily, Disp-60 capsule, R-11Normal      aspirin 81 MG tablet Take 81 mg by mouth daily. fish oil-omega-3 fatty acids 1000 MG capsule Take 2 g by mouth 2 times daily. ALLERGIES     has No Known Allergies. FAMILY HISTORY     indicated that his mother is alive. He indicated that his father is . He indicated that his sister is alive. He indicated that only one of his two brothers is alive. family history includes Heart Disease in his father. SOCIAL HISTORY      reports that he has been smoking Cigarettes. He has a 22.00 pack-year smoking history. He has never used smokeless tobacco. He reports that he does not drink alcohol or use drugs. PHYSICAL EXAM     INITIAL VITALS:  height is 6' 1\" (1.854 m) and weight is 285 lb (129.3 kg). His oral temperature is 98.7 °F (37.1 °C). His blood pressure is 140/84 (abnormal) and his pulse is 77. His respiration is 14 and oxygen saturation is 95%. chart in the absence of a cardiologist.    James Prowers. Rate: 71 bpm  AZ interval: 130 ms  QRS duration: 88 ms  QTc: 402 ms  P-R-T axes: 71, 128, 86  NSR. Right axis deviation. No STEMI       RADIOLOGY: non-plain film images(s) such as CT, Ultrasound and MRI are read by the radiologist.  Plain radiographic images are visualized and preliminarily interpreted by the emergency physician unless otherwise stated below. US RENAL COMPLETE   Final Result      Normal kidneys. No hydronephrosis. **This report has been created using voice recognition software. It may contain minor errors which are inherent in voice recognition technology. **       Final report electronically signed by Dr. Elida Gonzalez on 10/1/2018 6:42 PM      [address]   Final Result   No gallstones or sonographic abnormality identified. **This report has been created using voice recognition software. It may contain minor errors which are inherent in voice recognition technology. **      Final report electronically signed by Dr. Elida Gonzalez on 10/1/2018 6:39 PM      XR ABDOMEN (KUB) (SINGLE AP VIEW)   Final Result   Large amount of stool throughout the right colon. Correlate for constipation. **This report has been created using voice recognition software. It may contain minor errors which are inherent in voice recognition technology. **      Final report electronically signed by Dr. Elida Gonzalez on 10/1/2018 3:16 PM          LABS:   Labs Reviewed   CBC WITH AUTO DIFFERENTIAL - Abnormal; Notable for the following:        Result Value    WBC 14.0 (*)     MCV 94.7 (*)     Segs Absolute 12.2 (*)     All other components within normal limits   BASIC METABOLIC PANEL - Abnormal; Notable for the following:     Potassium 5.3 (*)     CO2 22 (*)     Glucose 132 (*)     BUN 23 (*)     CREATININE 1.4 (*)     All other components within normal limits   GLOMERULAR FILTRATION RATE, ESTIMATED - Abnormal; Notable for the following:     Est,

## 2018-10-02 ENCOUNTER — APPOINTMENT (OUTPATIENT)
Dept: ULTRASOUND IMAGING | Age: 43
End: 2018-10-02
Payer: COMMERCIAL

## 2018-10-02 ENCOUNTER — HOSPITAL ENCOUNTER (EMERGENCY)
Age: 43
Discharge: HOME | End: 2018-10-02
Attending: EMERGENCY MEDICINE
Payer: COMMERCIAL

## 2018-10-02 VITALS
OXYGEN SATURATION: 98 % | WEIGHT: 285 LBS | BODY MASS INDEX: 37.77 KG/M2 | TEMPERATURE: 98.4 F | DIASTOLIC BLOOD PRESSURE: 88 MMHG | HEART RATE: 94 BPM | SYSTOLIC BLOOD PRESSURE: 142 MMHG | HEIGHT: 73 IN | RESPIRATION RATE: 18 BRPM

## 2018-10-02 DIAGNOSIS — R10.31 ABDOMINAL PAIN, RIGHT LOWER QUADRANT: Primary | ICD-10-CM

## 2018-10-02 DIAGNOSIS — N17.9 ACUTE KIDNEY INJURY (HCC): ICD-10-CM

## 2018-10-02 DIAGNOSIS — R31.29 MICROSCOPIC HEMATURIA: ICD-10-CM

## 2018-10-02 LAB
ALBUMIN SERPL-MCNC: 4.1 G/DL (ref 3.5–5.1)
ALP BLD-CCNC: 67 U/L (ref 38–126)
ALT SERPL-CCNC: 21 U/L (ref 11–66)
ANION GAP SERPL CALCULATED.3IONS-SCNC: 13 MEQ/L (ref 8–16)
AST SERPL-CCNC: 20 U/L (ref 5–40)
BACTERIA: ABNORMAL /HPF
BASOPHILS # BLD: 0.4 %
BASOPHILS ABSOLUTE: 0.1 THOU/MM3 (ref 0–0.1)
BILIRUB SERPL-MCNC: 0.7 MG/DL (ref 0.3–1.2)
BILIRUBIN URINE: NEGATIVE
BLOOD, URINE: ABNORMAL
BUN BLDV-MCNC: 24 MG/DL (ref 7–22)
CALCIUM SERPL-MCNC: 9.2 MG/DL (ref 8.5–10.5)
CASTS 2: ABNORMAL /LPF
CASTS UA: ABNORMAL /LPF
CHARACTER, URINE: CLEAR
CHLORIDE BLD-SCNC: 100 MEQ/L (ref 98–111)
CO2: 24 MEQ/L (ref 23–33)
COLOR: YELLOW
CREAT SERPL-MCNC: 1.8 MG/DL (ref 0.4–1.2)
CRYSTALS, UA: ABNORMAL
EOSINOPHIL # BLD: 0.2 %
EOSINOPHILS ABSOLUTE: 0 THOU/MM3 (ref 0–0.4)
EPITHELIAL CELLS, UA: ABNORMAL /HPF
ERYTHROCYTE [DISTWIDTH] IN BLOOD BY AUTOMATED COUNT: 12.8 % (ref 11.5–14.5)
ERYTHROCYTE [DISTWIDTH] IN BLOOD BY AUTOMATED COUNT: 45.1 FL (ref 35–45)
GFR SERPL CREATININE-BSD FRML MDRD: 41 ML/MIN/1.73M2
GLUCOSE BLD-MCNC: 105 MG/DL (ref 70–108)
GLUCOSE URINE: NEGATIVE MG/DL
HCT VFR BLD CALC: 44.8 % (ref 42–52)
HEMOGLOBIN: 15.1 GM/DL (ref 14–18)
IMMATURE GRANS (ABS): 0.05 THOU/MM3 (ref 0–0.07)
IMMATURE GRANULOCYTES: 0.4 %
KETONES, URINE: NEGATIVE
LEUKOCYTE ESTERASE, URINE: NEGATIVE
LIPASE: 14 U/L (ref 5.6–51.3)
LYMPHOCYTES # BLD: 10.7 %
LYMPHOCYTES ABSOLUTE: 1.5 THOU/MM3 (ref 1–4.8)
MCH RBC QN AUTO: 32.1 PG (ref 26–33)
MCHC RBC AUTO-ENTMCNC: 33.7 GM/DL (ref 32.2–35.5)
MCV RBC AUTO: 95.1 FL (ref 80–94)
MISCELLANEOUS 2: ABNORMAL
MONOCYTES # BLD: 8.2 %
MONOCYTES ABSOLUTE: 1.1 THOU/MM3 (ref 0.4–1.3)
NITRITE, URINE: NEGATIVE
NUCLEATED RED BLOOD CELLS: 0 /100 WBC
OSMOLALITY CALCULATION: 278.2 MOSMOL/KG (ref 275–300)
PH UA: 5
PLATELET # BLD: 203 THOU/MM3 (ref 130–400)
PMV BLD AUTO: 10 FL (ref 9.4–12.4)
POTASSIUM SERPL-SCNC: 4.2 MEQ/L (ref 3.5–5.2)
PROTEIN UA: NEGATIVE
RBC # BLD: 4.71 MILL/MM3 (ref 4.7–6.1)
RBC URINE: ABNORMAL /HPF
RENAL EPITHELIAL, UA: ABNORMAL
SEG NEUTROPHILS: 80.1 %
SEGMENTED NEUTROPHILS ABSOLUTE COUNT: 11.2 THOU/MM3 (ref 1.8–7.7)
SODIUM BLD-SCNC: 137 MEQ/L (ref 135–145)
SPECIFIC GRAVITY, URINE: 1.02 (ref 1–1.03)
TOTAL PROTEIN: 6.7 G/DL (ref 6.1–8)
UROBILINOGEN, URINE: 0.2 EU/DL
WBC # BLD: 14 THOU/MM3 (ref 4.8–10.8)
WBC UA: ABNORMAL /HPF
YEAST: ABNORMAL

## 2018-10-02 PROCEDURE — 76870 US EXAM SCROTUM: CPT

## 2018-10-02 PROCEDURE — 80053 COMPREHEN METABOLIC PANEL: CPT

## 2018-10-02 PROCEDURE — 6360000002 HC RX W HCPCS: Performed by: EMERGENCY MEDICINE

## 2018-10-02 PROCEDURE — 99284 EMERGENCY DEPT VISIT MOD MDM: CPT

## 2018-10-02 PROCEDURE — 83690 ASSAY OF LIPASE: CPT

## 2018-10-02 PROCEDURE — 85025 COMPLETE CBC W/AUTO DIFF WBC: CPT

## 2018-10-02 PROCEDURE — 93010 ELECTROCARDIOGRAM REPORT: CPT | Performed by: INTERNAL MEDICINE

## 2018-10-02 PROCEDURE — 36415 COLL VENOUS BLD VENIPUNCTURE: CPT

## 2018-10-02 PROCEDURE — 81003 URINALYSIS AUTO W/O SCOPE: CPT

## 2018-10-02 PROCEDURE — 76770 US EXAM ABDO BACK WALL COMP: CPT

## 2018-10-02 PROCEDURE — 81001 URINALYSIS AUTO W/SCOPE: CPT

## 2018-10-02 PROCEDURE — 96374 THER/PROPH/DIAG INJ IV PUSH: CPT

## 2018-10-02 RX ORDER — KETOROLAC TROMETHAMINE 30 MG/ML
15 INJECTION, SOLUTION INTRAMUSCULAR; INTRAVENOUS ONCE
Status: COMPLETED | OUTPATIENT
Start: 2018-10-02 | End: 2018-10-02

## 2018-10-02 RX ADMIN — KETOROLAC TROMETHAMINE 15 MG: 30 INJECTION, SOLUTION INTRAMUSCULAR at 20:55

## 2018-10-02 ASSESSMENT — PAIN DESCRIPTION - ONSET: ONSET: ON-GOING

## 2018-10-02 ASSESSMENT — PAIN DESCRIPTION - PROGRESSION: CLINICAL_PROGRESSION: GRADUALLY WORSENING

## 2018-10-02 ASSESSMENT — ENCOUNTER SYMPTOMS
ABDOMINAL PAIN: 0
VOMITING: 0
NAUSEA: 0
DIARRHEA: 0

## 2018-10-02 ASSESSMENT — PAIN DESCRIPTION - DESCRIPTORS: DESCRIPTORS: CRAMPING

## 2018-10-02 ASSESSMENT — PAIN SCALES - GENERAL
PAINLEVEL_OUTOF10: 7
PAINLEVEL_OUTOF10: 7

## 2018-10-02 ASSESSMENT — PAIN DESCRIPTION - ORIENTATION: ORIENTATION: RIGHT;LOWER

## 2018-10-02 ASSESSMENT — PAIN DESCRIPTION - LOCATION: LOCATION: ABDOMEN

## 2018-10-02 ASSESSMENT — PAIN DESCRIPTION - PAIN TYPE: TYPE: ACUTE PAIN

## 2018-10-02 ASSESSMENT — PAIN DESCRIPTION - FREQUENCY: FREQUENCY: CONTINUOUS

## 2018-10-03 ENCOUNTER — TELEPHONE (OUTPATIENT)
Dept: UROLOGY | Age: 43
End: 2018-10-03

## 2018-10-03 ENCOUNTER — OFFICE VISIT (OUTPATIENT)
Dept: CARDIOLOGY CLINIC | Age: 43
End: 2018-10-03
Payer: COMMERCIAL

## 2018-10-03 ENCOUNTER — OFFICE VISIT (OUTPATIENT)
Dept: UROLOGY | Age: 43
End: 2018-10-03
Payer: COMMERCIAL

## 2018-10-03 VITALS
HEIGHT: 73 IN | SYSTOLIC BLOOD PRESSURE: 138 MMHG | BODY MASS INDEX: 37.88 KG/M2 | WEIGHT: 285.8 LBS | HEART RATE: 60 BPM | DIASTOLIC BLOOD PRESSURE: 82 MMHG

## 2018-10-03 VITALS
HEIGHT: 73 IN | DIASTOLIC BLOOD PRESSURE: 82 MMHG | WEIGHT: 287 LBS | SYSTOLIC BLOOD PRESSURE: 130 MMHG | BODY MASS INDEX: 38.04 KG/M2

## 2018-10-03 DIAGNOSIS — R10.31 RIGHT LOWER QUADRANT ABDOMINAL PAIN: ICD-10-CM

## 2018-10-03 DIAGNOSIS — N20.0 RENAL CALCULUS, RIGHT: Primary | ICD-10-CM

## 2018-10-03 DIAGNOSIS — I48.91 ATRIAL FIBRILLATION, UNSPECIFIED TYPE (HCC): ICD-10-CM

## 2018-10-03 DIAGNOSIS — Z01.810 PREOPERATIVE CARDIOVASCULAR EXAMINATION: Primary | ICD-10-CM

## 2018-10-03 DIAGNOSIS — R10.9 RIGHT FLANK PAIN: ICD-10-CM

## 2018-10-03 DIAGNOSIS — R39.12 WEAK URINARY STREAM: ICD-10-CM

## 2018-10-03 PROCEDURE — 99213 OFFICE O/P EST LOW 20 MIN: CPT | Performed by: INTERNAL MEDICINE

## 2018-10-03 PROCEDURE — 99203 OFFICE O/P NEW LOW 30 MIN: CPT | Performed by: NURSE PRACTITIONER

## 2018-10-03 RX ORDER — TAMSULOSIN HYDROCHLORIDE 0.4 MG/1
0.4 CAPSULE ORAL DAILY
Qty: 30 CAPSULE | Refills: 0 | Status: SHIPPED | OUTPATIENT
Start: 2018-10-03 | End: 2019-04-03

## 2018-10-03 RX ORDER — HYDROCODONE BITARTRATE AND ACETAMINOPHEN 5; 325 MG/1; MG/1
1 TABLET ORAL EVERY 6 HOURS PRN
Qty: 12 TABLET | Refills: 0 | Status: SHIPPED | OUTPATIENT
Start: 2018-10-03 | End: 2018-10-06

## 2018-10-03 NOTE — TELEPHONE ENCOUNTER
DO NOT TAKE ASPIRIN, FISH OIL, OR MOTRIN-LIKE DRUGS 7 DAYS      PRIOR TO SURGERY AND 3 DAYS FOLLOWING     Flakita Said 1975 Diagnosis: Kidney stone    Surgical Physician: Dr. Mary Hunter have been scheduled for the procedure marked below:      Surgery:  Cystoscopy, right ureteroscopy, laser lithotripsy, basket retrieval of stone fragments, and possible right ureteral stent placement           Date: 10-     Anesthesia: Anesthesiologist (General/Spinal)     Place of Service: [address] [place] [address]         Please be at the Outpatient Department Second Floor Same Day Surgery by:  3:00pm        INSTRUCTIONS AS MARKED BELOW:    1.  If you are having Local Anesthesia, PLEASE EAT A REGULAR BREAKFAST/LUNCH. 2.  If you are having (General/Spinal) Anesthesia:  DO NOT eat or drink anything after midnight before surgery. 3.  Please bring x-ray films to your office appointment. 4.  Please bring x-ray CD to your office appointment. 5.  PLEASE BRING THIS LETTER WITH YOU AND SHOW IT TO THE  AT [place] [phone]. 6.  may assist with your surgery  7. Does patient have a Pace Maker? No  8. Please send a copy to the Family Dr: Silvestre Estes MD  9. If you are a Medicare patient please bring your home Medications with you. 10. Please ensure to bring a  with you the day of the surgery to transport you home.       Date: 10/3/2018

## 2018-10-03 NOTE — PROGRESS NOTES
normal thickness, no calcification, and normal cuspal separation. DOPPLER: The  transpulmonic velocity was within the normal range. There was no regurgitation. PULMONARY ARTERY: The size was normal. DOPPLER: Systolic pressure was within the normal range. TRICUSPID VALVE: The valve structure was normal. There was normal leaflet separation. DOPPLER: The transtricuspid  velocity was within the normal range. There was no evidence for stenosis. There was mild regurgitation. RIGHT ATRIUM: Size was normal.    SYSTEMIC VEINS: IVC: The inferior vena cava was normal in size. PERICARDIUM: There was no pericardial effusion. The pericardium was normal in appearance. SYSTEM MEASUREMENT TABLES    2D mode  LA Dimension (2D): 2.8 cm  FS (2D-Cubed): 25 %  FS (2D-Teich): 25 %  IVSd (2D): 1 cm  IVSd; Mean chosen (2D): 1 cm  LVIDd (2D): 5.1 cm  LVIDs (2D): 3.8 cm  LVOT Area (2D): 3.1 cm2  LVPWd (2D): 0.9 cm  RVIDd (2D): 3.8 cm    M mode  AoR Diam (MM): 3 cm  AV Cusp Sep (MM): 2.2 cm  IVSd (MM): 1 cm  LVIDd (MM): 4.4 cm  LVIDs (MM): 3.3 cm  LVPWd (MM): 0.9 cm  MV D-E Exc: 1.9 cm  MV EF Uinta (MM): 7.9 cm/s  RVIDd (MM): 3.4 cm    Unspecified Scan Mode  CV Orifice Area; Cont Eq by VTI: 2 cm2  VTI; Antegrade Flow: 21.9 cm  Vmax; Antegrade Flow: 1 m/s  LVOT Diam: 2 cm  LVOT Vmax: 68.7 cm/s  MV Peak A Harshil; Mean: 89.5 cm/s  MV Peak E Harshil; Antegrade Flow: 100 cm/s  Vmax; Antegrade Flow: 35.9 cm/s  Vmax; Mean; Antegrade Flow: 35.9 cm/s  TV Peak A Harshil: 35.5 cm/s  TV Peak E Harshil; Antegrade Flow: 61.7 cm/s    SUMMARY:    Left ventricle: The ventricle was mildly dilated. Systolic function was normal. Ejection fraction was estimated in the range of 55 % to 65 %. There were no regional wall motion abnormalities. Doppler parameters were consistent with abnormal left ventricular relaxation (grade 1 diastolic dysfunction). Tricuspid valve: There was mild regurgitation.     Prepared and signed by    Sandy Sanabria DO  Signed 26-Jan-2011

## 2018-10-03 NOTE — ED TRIAGE NOTES
Arrives to [facility] ER for the evaluation of RLQ abdominal pain that radiates to right testicle and groin. Denies flank pain at this time. Has a history of right sided flank pain. No hematuria, dysuria, fevers, chills, or other urinary symptoms. Also denies N/V/D, constipation, SOB and chest pain. Was seen in the ED yesterday for the pain, which had resolved completely prior to the patient being discharged. Was sent home with RX for toradol and zofran. Needs to provide a urine specimen for testing. Will monitor.

## 2018-10-03 NOTE — PATIENT INSTRUCTIONS
trying to quit smoking. · Consider signing up for a smoking cessation program, such as the American Lung Association's Freedom from Smoking program.  · Get text messaging support. Go to the website at www.smokefree. gov to sign up for the [facility] program.  · Set a quit date. Pick your date carefully so that it is not right in the middle of a big deadline or stressful time. Once you quit, do not even take a puff. Get rid of all ashtrays and lighters after your last cigarette. Clean your house and your clothes so that they do not smell of smoke. · Learn how to be a nonsmoker. Think about ways you can avoid those things that make you reach for a cigarette. ¨ Avoid situations that put you at greatest risk for smoking. For some people, it is hard to have a drink with friends without smoking. For others, they might skip a coffee break with coworkers who smoke. ¨ Change your daily routine. Take a different route to work or eat a meal in a different place. · Cut down on stress. Calm yourself or release tension by doing an activity you enjoy, such as reading a book, taking a hot bath, or gardening. · Talk to your doctor or pharmacist about nicotine replacement therapy, which replaces the nicotine in your body. You still get nicotine but you do not use tobacco. Nicotine replacement products help you slowly reduce the amount of nicotine you need. These products come in several forms, many of them available over-the-counter:  ¨ Nicotine patches  ¨ Nicotine gum and lozenges  ¨ Nicotine inhaler  · Ask your doctor about bupropion (Wellbutrin) or varenicline (Chantix), which are prescription medicines. They do not contain nicotine. They help you by reducing withdrawal symptoms, such as stress and anxiety. · Some people find hypnosis, acupuncture, and massage helpful for ending the smoking habit. · Eat a healthy diet and get regular exercise.  Having healthy habits will help your body move past its craving for

## 2018-10-04 ENCOUNTER — TELEPHONE (OUTPATIENT)
Dept: SURGERY | Age: 43
End: 2018-10-04

## 2018-10-04 ENCOUNTER — HOSPITAL ENCOUNTER (OUTPATIENT)
Dept: PULMONOLOGY | Age: 43
Discharge: HOME | End: 2018-10-04
Payer: COMMERCIAL

## 2018-10-04 PROCEDURE — 94726 PLETHYSMOGRAPHY LUNG VOLUMES: CPT

## 2018-10-04 PROCEDURE — 94729 DIFFUSING CAPACITY: CPT

## 2018-10-04 PROCEDURE — 94010 BREATHING CAPACITY TEST: CPT

## 2018-10-04 NOTE — TELEPHONE ENCOUNTER
Samira Harden has been cleared for surgery by Dr Raisa Ferreira so he called in today & is scheduled for Mon 10/15 at 2pm & he will arrive at 12:15. He will be npo after midnight & consent was signed. Per Samira Harden he is not taking the pradaxa now only the 81 aspirin. Surgery info was given over the phone & Jorge voiced understanding.

## 2018-10-05 DIAGNOSIS — N20.0 RENAL CALCULUS, RIGHT: ICD-10-CM

## 2018-10-05 RX ORDER — HYDROCODONE BITARTRATE AND ACETAMINOPHEN 5; 325 MG/1; MG/1
1 TABLET ORAL EVERY 6 HOURS PRN
Qty: 12 TABLET | Refills: 0 | Status: CANCELLED | OUTPATIENT
Start: 2018-10-05 | End: 2018-10-08

## 2018-10-05 RX ORDER — HYDROCODONE BITARTRATE AND ACETAMINOPHEN 5; 325 MG/1; MG/1
1 TABLET ORAL EVERY 6 HOURS PRN
Qty: 12 TABLET | Refills: 0 | Status: SHIPPED | OUTPATIENT
Start: 2018-10-05 | End: 2018-10-10

## 2018-10-09 ENCOUNTER — TELEPHONE (OUTPATIENT)
Dept: UROLOGY | Age: 43
End: 2018-10-09

## 2018-10-09 DIAGNOSIS — N20.0 KIDNEY STONES: Primary | ICD-10-CM

## 2018-10-14 NOTE — H&P
[facility] [address] [address]  [address]  Dept: [phone]  Dept Fax: [phone]  Loc: [phone]    Visit Date: 9/25/2018    Nancy Montalvo is a 37 y.o. male who presents today for:  Chief Complaint   Patient presents with    Surgical Consult     New Pt. Ref. Maurice Genera- Biliary Colic       HPI:     HPI 42-year-old white male somewhat of a difficult historian but basically in December of last year had what sounds like a biliary colic attack and was seen in the emergency room and had a CT scan performed. The reading on that CT was that there was a \"subtle\" gallstone in the neck of the gallbladder. Patient is unclear as to if any recommendation was given for cholecystectomy within the patient denies any other symptoms until recently had another attack. He presented to the emergency room again had a CT scan performed that showed no other significant abnormalities and now the stone that was seen previously could not be seen. Patient initially states they did an ultrasound but on further questioning in search there is no evidence of ultrasound in radiology but I believe the ER physician used the ultrasound machine in the ER and patient states he could not determine definitive gallstones.   Patient has been scheduled for a HIDA scan but also then was sent for my evaluation patient does have a history of coronary artery disease and a history of atrial fibrillation patient does take Pradaxa but apparently quit on his own earlier this year    Past Medical History:   Diagnosis Date    Arthritis     Atrial fibrillation ([address]     CAD (coronary artery disease)     Chest pain     Heart palpitations     Hypertension     Nausea & vomiting     Sleep apnea     wears cpap      Past Surgical History:   Procedure Laterality Date    CARDIAC SURGERY  1/14    ABLATION AT [facility] FOOT SURGERY Right 2012     Family History   Problem

## 2018-10-15 ENCOUNTER — ANESTHESIA EVENT (OUTPATIENT)
Dept: OPERATING ROOM | Age: 43
End: 2018-10-15
Payer: COMMERCIAL

## 2018-10-15 ENCOUNTER — ANESTHESIA (OUTPATIENT)
Dept: OPERATING ROOM | Age: 43
End: 2018-10-15
Payer: COMMERCIAL

## 2018-10-15 ENCOUNTER — HOSPITAL ENCOUNTER (OUTPATIENT)
Age: 43
Setting detail: OUTPATIENT SURGERY
Discharge: HOME | End: 2018-10-15
Attending: SURGERY | Admitting: SURGERY
Payer: COMMERCIAL

## 2018-10-15 VITALS
OXYGEN SATURATION: 98 % | SYSTOLIC BLOOD PRESSURE: 162 MMHG | DIASTOLIC BLOOD PRESSURE: 93 MMHG | RESPIRATION RATE: 1 BRPM | TEMPERATURE: 96.8 F

## 2018-10-15 VITALS
WEIGHT: 273.4 LBS | RESPIRATION RATE: 14 BRPM | BODY MASS INDEX: 37.03 KG/M2 | OXYGEN SATURATION: 97 % | HEART RATE: 71 BPM | DIASTOLIC BLOOD PRESSURE: 63 MMHG | HEIGHT: 72 IN | TEMPERATURE: 98.5 F | SYSTOLIC BLOOD PRESSURE: 120 MMHG

## 2018-10-15 DIAGNOSIS — K80.50 BILIARY COLIC: Primary | ICD-10-CM

## 2018-10-15 LAB — POTASSIUM SERPL-SCNC: 4.9 MEQ/L (ref 3.5–5.2)

## 2018-10-15 PROCEDURE — 7100000000 HC PACU RECOVERY - FIRST 15 MIN: Performed by: SURGERY

## 2018-10-15 PROCEDURE — 2580000003 HC RX 258

## 2018-10-15 PROCEDURE — 36415 COLL VENOUS BLD VENIPUNCTURE: CPT

## 2018-10-15 PROCEDURE — 3700000000 HC ANESTHESIA ATTENDED CARE: Performed by: SURGERY

## 2018-10-15 PROCEDURE — 2500000003 HC RX 250 WO HCPCS: Performed by: SURGERY

## 2018-10-15 PROCEDURE — 6360000002 HC RX W HCPCS: Performed by: NURSE ANESTHETIST, CERTIFIED REGISTERED

## 2018-10-15 PROCEDURE — 3600000013 HC SURGERY LEVEL 3 ADDTL 15MIN: Performed by: SURGERY

## 2018-10-15 PROCEDURE — 88304 TISSUE EXAM BY PATHOLOGIST: CPT

## 2018-10-15 PROCEDURE — 6360000002 HC RX W HCPCS: Performed by: ANESTHESIOLOGY

## 2018-10-15 PROCEDURE — 47562 LAPAROSCOPIC CHOLECYSTECTOMY: CPT | Performed by: SURGERY

## 2018-10-15 PROCEDURE — 2500000003 HC RX 250 WO HCPCS: Performed by: NURSE ANESTHETIST, CERTIFIED REGISTERED

## 2018-10-15 PROCEDURE — 3600000003 HC SURGERY LEVEL 3 BASE: Performed by: SURGERY

## 2018-10-15 PROCEDURE — 7100000011 HC PHASE II RECOVERY - ADDTL 15 MIN: Performed by: SURGERY

## 2018-10-15 PROCEDURE — 6370000000 HC RX 637 (ALT 250 FOR IP)

## 2018-10-15 PROCEDURE — 84132 ASSAY OF SERUM POTASSIUM: CPT

## 2018-10-15 PROCEDURE — 7100000001 HC PACU RECOVERY - ADDTL 15 MIN: Performed by: SURGERY

## 2018-10-15 PROCEDURE — 7100000010 HC PHASE II RECOVERY - FIRST 15 MIN: Performed by: SURGERY

## 2018-10-15 PROCEDURE — 3700000001 HC ADD 15 MINUTES (ANESTHESIA): Performed by: SURGERY

## 2018-10-15 PROCEDURE — 2709999900 HC NON-CHARGEABLE SUPPLY: Performed by: SURGERY

## 2018-10-15 RX ORDER — BUPIVACAINE HYDROCHLORIDE AND EPINEPHRINE 5; 5 MG/ML; UG/ML
INJECTION, SOLUTION EPIDURAL; INTRACAUDAL; PERINEURAL PRN
Status: DISCONTINUED | OUTPATIENT
Start: 2018-10-15 | End: 2018-10-15 | Stop reason: HOSPADM

## 2018-10-15 RX ORDER — ROCURONIUM BROMIDE 10 MG/ML
INJECTION, SOLUTION INTRAVENOUS PRN
Status: DISCONTINUED | OUTPATIENT
Start: 2018-10-15 | End: 2018-10-15 | Stop reason: SDUPTHER

## 2018-10-15 RX ORDER — PROMETHAZINE HYDROCHLORIDE 25 MG/ML
12.5 INJECTION, SOLUTION INTRAMUSCULAR; INTRAVENOUS
Status: COMPLETED | OUTPATIENT
Start: 2018-10-15 | End: 2018-10-15

## 2018-10-15 RX ORDER — FENTANYL CITRATE 50 UG/ML
INJECTION, SOLUTION INTRAMUSCULAR; INTRAVENOUS PRN
Status: DISCONTINUED | OUTPATIENT
Start: 2018-10-15 | End: 2018-10-15 | Stop reason: SDUPTHER

## 2018-10-15 RX ORDER — LABETALOL HYDROCHLORIDE 5 MG/ML
5 INJECTION, SOLUTION INTRAVENOUS EVERY 10 MIN PRN
Status: DISCONTINUED | OUTPATIENT
Start: 2018-10-15 | End: 2018-10-15 | Stop reason: HOSPADM

## 2018-10-15 RX ORDER — LIDOCAINE HYDROCHLORIDE 20 MG/ML
INJECTION, SOLUTION INFILTRATION; PERINEURAL PRN
Status: DISCONTINUED | OUTPATIENT
Start: 2018-10-15 | End: 2018-10-15 | Stop reason: SDUPTHER

## 2018-10-15 RX ORDER — GLYCOPYRROLATE 1 MG/5 ML
SYRINGE (ML) INTRAVENOUS PRN
Status: DISCONTINUED | OUTPATIENT
Start: 2018-10-15 | End: 2018-10-15 | Stop reason: SDUPTHER

## 2018-10-15 RX ORDER — KETOROLAC TROMETHAMINE 30 MG/ML
INJECTION, SOLUTION INTRAMUSCULAR; INTRAVENOUS PRN
Status: DISCONTINUED | OUTPATIENT
Start: 2018-10-15 | End: 2018-10-15 | Stop reason: SDUPTHER

## 2018-10-15 RX ORDER — SODIUM CHLORIDE 9 MG/ML
INJECTION, SOLUTION INTRAVENOUS CONTINUOUS
Status: DISCONTINUED | OUTPATIENT
Start: 2018-10-15 | End: 2018-10-15 | Stop reason: HOSPADM

## 2018-10-15 RX ORDER — MEPERIDINE HYDROCHLORIDE 25 MG/ML
12.5 INJECTION INTRAMUSCULAR; INTRAVENOUS; SUBCUTANEOUS EVERY 5 MIN PRN
Status: DISCONTINUED | OUTPATIENT
Start: 2018-10-15 | End: 2018-10-15 | Stop reason: HOSPADM

## 2018-10-15 RX ORDER — OXYCODONE HYDROCHLORIDE AND ACETAMINOPHEN 5; 325 MG/1; MG/1
1 TABLET ORAL EVERY 4 HOURS PRN
Status: DISCONTINUED | OUTPATIENT
Start: 2018-10-15 | End: 2018-10-15 | Stop reason: HOSPADM

## 2018-10-15 RX ORDER — NEOSTIGMINE METHYLSULFATE 1 MG/ML
INJECTION, SOLUTION INTRAVENOUS PRN
Status: DISCONTINUED | OUTPATIENT
Start: 2018-10-15 | End: 2018-10-15 | Stop reason: SDUPTHER

## 2018-10-15 RX ORDER — OXYCODONE HYDROCHLORIDE AND ACETAMINOPHEN 5; 325 MG/1; MG/1
1 TABLET ORAL EVERY 6 HOURS PRN
Qty: 20 TABLET | Refills: 0 | Status: SHIPPED | OUTPATIENT
Start: 2018-10-15 | End: 2018-10-21

## 2018-10-15 RX ORDER — DEXAMETHASONE SODIUM PHOSPHATE 4 MG/ML
INJECTION, SOLUTION INTRA-ARTICULAR; INTRALESIONAL; INTRAMUSCULAR; INTRAVENOUS; SOFT TISSUE PRN
Status: DISCONTINUED | OUTPATIENT
Start: 2018-10-15 | End: 2018-10-15 | Stop reason: SDUPTHER

## 2018-10-15 RX ORDER — OXYCODONE HYDROCHLORIDE AND ACETAMINOPHEN 5; 325 MG/1; MG/1
TABLET ORAL
Status: COMPLETED
Start: 2018-10-15 | End: 2018-10-15

## 2018-10-15 RX ORDER — PROPOFOL 10 MG/ML
INJECTION, EMULSION INTRAVENOUS PRN
Status: DISCONTINUED | OUTPATIENT
Start: 2018-10-15 | End: 2018-10-15 | Stop reason: SDUPTHER

## 2018-10-15 RX ORDER — FENTANYL CITRATE 50 UG/ML
50 INJECTION, SOLUTION INTRAMUSCULAR; INTRAVENOUS EVERY 5 MIN PRN
Status: DISCONTINUED | OUTPATIENT
Start: 2018-10-15 | End: 2018-10-15 | Stop reason: HOSPADM

## 2018-10-15 RX ORDER — MIDAZOLAM HYDROCHLORIDE 1 MG/ML
INJECTION INTRAMUSCULAR; INTRAVENOUS PRN
Status: DISCONTINUED | OUTPATIENT
Start: 2018-10-15 | End: 2018-10-15 | Stop reason: SDUPTHER

## 2018-10-15 RX ORDER — FENTANYL CITRATE 50 UG/ML
25 INJECTION, SOLUTION INTRAMUSCULAR; INTRAVENOUS EVERY 5 MIN PRN
Status: DISCONTINUED | OUTPATIENT
Start: 2018-10-15 | End: 2018-10-15 | Stop reason: HOSPADM

## 2018-10-15 RX ORDER — ONDANSETRON 2 MG/ML
INJECTION INTRAMUSCULAR; INTRAVENOUS PRN
Status: DISCONTINUED | OUTPATIENT
Start: 2018-10-15 | End: 2018-10-15 | Stop reason: SDUPTHER

## 2018-10-15 RX ADMIN — FENTANYL CITRATE 50 MCG: 50 INJECTION INTRAMUSCULAR; INTRAVENOUS at 15:35

## 2018-10-15 RX ADMIN — FENTANYL CITRATE 50 MCG: 50 INJECTION INTRAMUSCULAR; INTRAVENOUS at 15:20

## 2018-10-15 RX ADMIN — PROMETHAZINE HYDROCHLORIDE 12.5 MG: 25 INJECTION INTRAMUSCULAR; INTRAVENOUS at 15:56

## 2018-10-15 RX ADMIN — PROPOFOL 200 MG: 10 INJECTION, EMULSION INTRAVENOUS at 14:52

## 2018-10-15 RX ADMIN — ONDANSETRON HYDROCHLORIDE 4 MG: 4 INJECTION, SOLUTION INTRAMUSCULAR; INTRAVENOUS at 15:00

## 2018-10-15 RX ADMIN — OXYCODONE HYDROCHLORIDE AND ACETAMINOPHEN 1 TABLET: 5; 325 TABLET ORAL at 18:37

## 2018-10-15 RX ADMIN — SODIUM CHLORIDE: 9 INJECTION, SOLUTION INTRAVENOUS at 14:47

## 2018-10-15 RX ADMIN — ROCURONIUM BROMIDE 50 MG: 10 INJECTION INTRAVENOUS at 14:52

## 2018-10-15 RX ADMIN — NEOSTIGMINE METHYLSULFATE 5 MG: 1 INJECTION, SOLUTION INTRAVENOUS at 15:26

## 2018-10-15 RX ADMIN — FENTANYL CITRATE 50 MCG: 50 INJECTION INTRAMUSCULAR; INTRAVENOUS at 15:32

## 2018-10-15 RX ADMIN — SODIUM CHLORIDE: 9 INJECTION, SOLUTION INTRAVENOUS at 12:42

## 2018-10-15 RX ADMIN — Medication 0.8 MG: at 15:26

## 2018-10-15 RX ADMIN — DEXAMETHASONE SODIUM PHOSPHATE 8 MG: 4 INJECTION, SOLUTION INTRAMUSCULAR; INTRAVENOUS at 15:00

## 2018-10-15 RX ADMIN — MIDAZOLAM HYDROCHLORIDE 2 MG: 1 INJECTION, SOLUTION INTRAMUSCULAR; INTRAVENOUS at 14:47

## 2018-10-15 RX ADMIN — FENTANYL CITRATE 50 MCG: 50 INJECTION INTRAMUSCULAR; INTRAVENOUS at 15:37

## 2018-10-15 RX ADMIN — SODIUM CHLORIDE: 9 INJECTION, SOLUTION INTRAVENOUS at 15:25

## 2018-10-15 RX ADMIN — FENTANYL CITRATE 100 MCG: 50 INJECTION INTRAMUSCULAR; INTRAVENOUS at 14:51

## 2018-10-15 RX ADMIN — KETOROLAC TROMETHAMINE 30 MG: 30 INJECTION, SOLUTION INTRAMUSCULAR; INTRAVENOUS at 15:13

## 2018-10-15 RX ADMIN — LIDOCAINE HYDROCHLORIDE 100 MG: 20 INJECTION, SOLUTION INFILTRATION; PERINEURAL at 14:52

## 2018-10-15 ASSESSMENT — PULMONARY FUNCTION TESTS
PIF_VALUE: 6
PIF_VALUE: 20
PIF_VALUE: 19
PIF_VALUE: 14
PIF_VALUE: 21
PIF_VALUE: 7
PIF_VALUE: 13
PIF_VALUE: 20
PIF_VALUE: 14
PIF_VALUE: 21
PIF_VALUE: 6
PIF_VALUE: 1
PIF_VALUE: 5
PIF_VALUE: 21
PIF_VALUE: 15
PIF_VALUE: 7
PIF_VALUE: 12
PIF_VALUE: 16
PIF_VALUE: 30
PIF_VALUE: 23
PIF_VALUE: 14
PIF_VALUE: 30
PIF_VALUE: 17
PIF_VALUE: 21
PIF_VALUE: 2
PIF_VALUE: 15
PIF_VALUE: 21
PIF_VALUE: 19
PIF_VALUE: 15
PIF_VALUE: 1
PIF_VALUE: 20
PIF_VALUE: 13
PIF_VALUE: 6
PIF_VALUE: 20
PIF_VALUE: 21
PIF_VALUE: 12
PIF_VALUE: 21
PIF_VALUE: 20
PIF_VALUE: 20
PIF_VALUE: 11
PIF_VALUE: 16
PIF_VALUE: 13
PIF_VALUE: 6
PIF_VALUE: 1
PIF_VALUE: 12
PIF_VALUE: 13
PIF_VALUE: 15

## 2018-10-15 ASSESSMENT — PAIN SCALES - GENERAL
PAINLEVEL_OUTOF10: 3
PAINLEVEL_OUTOF10: 0
PAINLEVEL_OUTOF10: 5
PAINLEVEL_OUTOF10: 2

## 2018-10-15 NOTE — PROGRESS NOTES
Oriented to [facility]   12      Refuses flu and pneumonia vaccine. Family updated to register with volunteer out at . Informed family to take belonging with them. Keep nothing of value in room unattended. Medication given back to family. Family is taking them with them. Informed to get the case number for surgery from volunteer out front to be able to follow them through surgery.

## 2018-10-16 ENCOUNTER — TELEPHONE (OUTPATIENT)
Dept: SURGERY | Age: 43
End: 2018-10-16

## 2018-10-18 ENCOUNTER — OFFICE VISIT (OUTPATIENT)
Dept: PULMONOLOGY | Age: 43
End: 2018-10-18
Payer: COMMERCIAL

## 2018-10-18 VITALS
DIASTOLIC BLOOD PRESSURE: 86 MMHG | WEIGHT: 284 LBS | HEIGHT: 73 IN | OXYGEN SATURATION: 100 % | TEMPERATURE: 97.7 F | SYSTOLIC BLOOD PRESSURE: 130 MMHG | BODY MASS INDEX: 37.64 KG/M2 | HEART RATE: 80 BPM

## 2018-10-18 DIAGNOSIS — F17.200 NICOTINE DEPENDENCE, UNCOMPLICATED, UNSPECIFIED NICOTINE PRODUCT TYPE: Primary | ICD-10-CM

## 2018-10-18 DIAGNOSIS — G47.33 OSA (OBSTRUCTIVE SLEEP APNEA): ICD-10-CM

## 2018-10-18 DIAGNOSIS — R91.8 GROUND GLASS OPACITY PRESENT ON IMAGING OF LUNG: ICD-10-CM

## 2018-10-18 PROCEDURE — 99204 OFFICE O/P NEW MOD 45 MIN: CPT | Performed by: INTERNAL MEDICINE

## 2018-10-18 RX ORDER — VARENICLINE TARTRATE 25 MG
KIT ORAL
Qty: 53 TABLET | Refills: 0 | Status: SHIPPED | OUTPATIENT
Start: 2018-10-18 | End: 2019-04-03

## 2018-10-18 RX ORDER — VARENICLINE TARTRATE 1 MG/1
1 TABLET, FILM COATED ORAL 2 TIMES DAILY
Qty: 60 TABLET | Refills: 11 | Status: SHIPPED | OUTPATIENT
Start: 2018-10-18 | End: 2019-04-03

## 2018-10-18 ASSESSMENT — ENCOUNTER SYMPTOMS
COUGH: 0
WHEEZING: 0
CHEST TIGHTNESS: 0
BACK PAIN: 0
SHORTNESS OF BREATH: 0
VOICE CHANGE: 0
TROUBLE SWALLOWING: 0

## 2018-10-18 NOTE — COMMUNICATION BODY
Subjective:      Patient ID: Reilly Banuelos is a 37 y.o. male. CC: GGO in CT chest      HPI    Went to ED with Gallbladder attack, they did CT-A chest and reported with GGO  Smokes 1ppd, started at 16  No chest sx'x Denies SOB, VALE, sleeps flat on bed on his side, no hemoptysis, no wheezing, productive cough in the mornig  Had A fib s/p ablation by Horacio Hyde in [facility], still on Diltiazem  [address]  PATRIA on CPAP used to see [employer]   works works for [employer] gets to inhale some smoke/rubber  Had Lap kimmy 3 days ago  Reviewing CT-A  chest  Diffuse haziness in dependent areas of the lungs likely due to gravitational distribution of blood flow there    Wises to quit smoking  PFTs are normal    Review of Systems   Constitutional: Negative for fatigue and unexpected weight change. HENT: Negative for trouble swallowing and voice change. Respiratory: Negative for cough, chest tightness, shortness of breath and wheezing. Musculoskeletal: Negative for arthralgias and back pain. Neurological: Negative for headaches. Psychiatric/Behavioral: Negative for behavioral problems.          All other systems reviewed and are negative    Lung Nodule Screening     [] Qualifies    [x] Does not qualify   [] Declined   [] Completed  Past Medical History:   Diagnosis Date    Arthritis     Atrial fibrillation ([address]     CAD (coronary artery disease)     Chest pain     Heart palpitations     Hypertension     Nausea & vomiting     PONV (postoperative nausea and vomiting)     Sleep apnea     wears cpap     Past Surgical History:   Procedure Laterality Date    CARDIAC SURGERY  1/14    [place] [address] [facility] SURGERY Right 2012    [place] LAP,CHOLECYSTECTOMY/GRAPH N/A 10/15/2018    LAP CHOLECYSTECTOMY performed by Alonso Kerr MD at [facility] History   Substance Use Topics    Smoking status: Current Every Day Smoker     Packs/day: 1.00     Years: 22.00     Types: Cigarettes    Smokeless

## 2018-10-18 NOTE — LETTER
 CAD (coronary artery disease)     Chest pain     Heart palpitations     Hypertension     Nausea & vomiting     PONV (postoperative nausea and vomiting)     Sleep apnea     wears cpap     Past Surgical History:   Procedure Laterality Date    CARDIAC SURGERY  1/14    ABLATION AT [facility]    FOOT SURGERY Right 2012    [place] LAP,CHOLECYSTECTOMY/GRAPH N/A 10/15/2018    LAP CHOLECYSTECTOMY performed by Jazzy Bills MD at [address] History   Substance Use Topics    Smoking status: Current Every Day Smoker     Packs/day: 1.00     Years: 22.00     Types: Cigarettes    Smokeless tobacco: Never Used    Alcohol use No      No Known Allergies   Family History   Problem Relation Age of Onset    Heart Disease Father      Current Outpatient Prescriptions   Medication Sig Dispense Refill    oxyCODONE-acetaminophen (PERCOCET) 5-325 MG per tablet Take 1 tablet by mouth every 6 hours as needed for Pain for up to 20 doses. Intended supply: 5 days. Take lowest dose possible to manage pain. 20 tablet 0    tamsulosin (FLOMAX) 0.4 MG capsule Take 1 capsule by mouth daily 30 capsule 0    ketorolac (TORADOL) 10 MG tablet Take 1 tablet by mouth every 6 hours as needed for Pain 15 tablet 0    ondansetron (ZOFRAN ODT) 4 MG disintegrating tablet Take 1 tablet by mouth every 8 hours as needed for Nausea 10 tablet 0    diltiazem (CARDIZEM CD) 120 MG extended release capsule Take 1 capsule by mouth 2 times daily (Patient taking differently: Take 180 mg by mouth 2 times daily ) 60 capsule 11    fish oil-omega-3 fatty acids 1000 MG capsule Take 2 g by mouth 2 times daily. No current facility-administered medications for this visit.       LABS - none   /86   Pulse 80   Temp 97.7 °F (36.5 °C)   Ht 6' 1\" (1.854 m)   Wt 284 lb (128.8 kg)   SpO2 100% Comment: room air  BMI 37.47 kg/m²     Wt Readings from Last 3 Encounters:   10/18/18 284 lb (128.8 kg)   10/15/18 273 lb 6.4 oz (124 kg)

## 2018-10-18 NOTE — PROGRESS NOTES
Subjective:      Patient ID: Alona De La Torre is a 37 y.o. male. CC: GGO in CT chest      HPI    Went to ED with Gallbladder attack, they did CT-A chest and reported with GGO  Smokes 1ppd, started at 16  No chest sx'x Denies SOB, VALE, sleeps flat on bed on his side, no hemoptysis, no wheezing, productive cough in the mornig  Had A fib s/p ablation by Veena Medina in [facility], still on Diltiazem  [address]  PATRIA on CPAP used to see [employer]   works works for [employer] gets to inhale some smoke/rubber  Had Lap kimmy 3 days ago  Reviewing CT-A  chest  Diffuse haziness in dependent areas of the lungs likely due to gravitational distribution of blood flow there    Wises to quit smoking  PFTs are normal    Review of Systems   Constitutional: Negative for fatigue and unexpected weight change. HENT: Negative for trouble swallowing and voice change. Respiratory: Negative for cough, chest tightness, shortness of breath and wheezing. Musculoskeletal: Negative for arthralgias and back pain. Neurological: Negative for headaches. Psychiatric/Behavioral: Negative for behavioral problems.          All other systems reviewed and are negative    Lung Nodule Screening     [] Qualifies    [x] Does not qualify   [] Declined   [] Completed  Past Medical History:   Diagnosis Date    Arthritis     [facility]     CAD (coronary artery disease)     Chest pain     Heart palpitations     Hypertension     Nausea & vomiting     PONV (postoperative nausea and vomiting)     Sleep apnea     wears cpap     Past Surgical History:   Procedure Laterality Date    CARDIAC SURGERY  1/14    ABLATION AT [address] [facility] FOOT SURGERY Right 2012    [place] LAP,CHOLECYSTECTOMY/GRAPH N/A 10/15/2018    LAP CHOLECYSTECTOMY performed by Andressa Cueva MD at [facility] History   Substance Use Topics    Smoking status: Current Every Day Smoker     Packs/day: 1.00     Years: 22.00     Types: Cigarettes    Smokeless specific finding of haziness in dependent areas of the lung due to blood flow distribution, normal PFTs including DLco points against ILD      Plan:        Orders Placed This Encounter   Medications    varenicline (CHANTIX STARTING MONTH PAK) 0.5 MG X 11 & 1 MG X 42 tablet     Sig: Take by mouth. Dispense:  53 tablet     Refill:  0    varenicline (CHANTIX CONTINUING MONTH PAK) 1 MG tablet     Sig: Take 1 tablet by mouth 2 times daily     Dispense:  60 tablet     Refill:  11      Patient was counseled on tobacco cessation. Based upon patient's motivation to change his behavior, the following plan was agreed upon: patient will try the following tobacco cessation strategies:  Chantix: risks and benefits of this medication discussed- patient will call for any significant adverse effects. He was provided with a list of local tobacco cessation resources. Provider spent 10 minutes counseling patient.        RTC 3 mos

## 2018-10-30 ENCOUNTER — OFFICE VISIT (OUTPATIENT)
Dept: SURGERY | Age: 43
End: 2018-10-30

## 2018-10-30 VITALS
WEIGHT: 290 LBS | DIASTOLIC BLOOD PRESSURE: 80 MMHG | TEMPERATURE: 97.8 F | OXYGEN SATURATION: 98 % | HEIGHT: 73 IN | SYSTOLIC BLOOD PRESSURE: 130 MMHG | BODY MASS INDEX: 38.43 KG/M2 | RESPIRATION RATE: 18 BRPM | HEART RATE: 94 BPM

## 2018-10-30 DIAGNOSIS — Z90.49 S/P LAPAROSCOPIC CHOLECYSTECTOMY: Primary | ICD-10-CM

## 2018-10-30 PROCEDURE — 99024 POSTOP FOLLOW-UP VISIT: CPT | Performed by: NURSE PRACTITIONER

## 2018-10-30 ASSESSMENT — ENCOUNTER SYMPTOMS
EYE PAIN: 0
BACK PAIN: 0
VOICE CHANGE: 0
DIARRHEA: 0
PHOTOPHOBIA: 0
EYE ITCHING: 0
SINUS PRESSURE: 0
CHEST TIGHTNESS: 0
ANAL BLEEDING: 0
WHEEZING: 0
COUGH: 0
NAUSEA: 0
COLOR CHANGE: 0
ABDOMINAL PAIN: 0
ABDOMINAL DISTENTION: 0
BLOOD IN STOOL: 0
RHINORRHEA: 0
RECTAL PAIN: 0
EYE DISCHARGE: 0
CHOKING: 0
VOMITING: 0
CONSTIPATION: 0
APNEA: 0
STRIDOR: 0
EYE REDNESS: 0
SHORTNESS OF BREATH: 0
SORE THROAT: 0
FACIAL SWELLING: 0
TROUBLE SWALLOWING: 0

## 2019-01-24 ENCOUNTER — OFFICE VISIT (OUTPATIENT)
Dept: PULMONOLOGY | Age: 44
End: 2019-01-24
Payer: COMMERCIAL

## 2019-01-24 VITALS
SYSTOLIC BLOOD PRESSURE: 132 MMHG | HEIGHT: 73 IN | DIASTOLIC BLOOD PRESSURE: 84 MMHG | HEART RATE: 77 BPM | OXYGEN SATURATION: 97 % | TEMPERATURE: 96.9 F | WEIGHT: 300.2 LBS | BODY MASS INDEX: 39.79 KG/M2

## 2019-01-24 DIAGNOSIS — Z72.0 TOBACCO ABUSE: Primary | ICD-10-CM

## 2019-01-24 PROCEDURE — 99213 OFFICE O/P EST LOW 20 MIN: CPT | Performed by: INTERNAL MEDICINE

## 2019-01-24 ASSESSMENT — ENCOUNTER SYMPTOMS
BACK PAIN: 0
TROUBLE SWALLOWING: 0
VOICE CHANGE: 0
SHORTNESS OF BREATH: 0
WHEEZING: 0
COUGH: 0
CHEST TIGHTNESS: 0

## 2019-04-03 ENCOUNTER — OFFICE VISIT (OUTPATIENT)
Dept: CARDIOLOGY CLINIC | Age: 44
End: 2019-04-03
Payer: COMMERCIAL

## 2019-04-03 VITALS
BODY MASS INDEX: 40.05 KG/M2 | DIASTOLIC BLOOD PRESSURE: 88 MMHG | HEIGHT: 73 IN | SYSTOLIC BLOOD PRESSURE: 148 MMHG | HEART RATE: 92 BPM | WEIGHT: 302.2 LBS

## 2019-04-03 DIAGNOSIS — I48.91 ATRIAL FIBRILLATION, UNSPECIFIED TYPE (HCC): Primary | ICD-10-CM

## 2019-04-03 DIAGNOSIS — I10 ESSENTIAL HYPERTENSION: ICD-10-CM

## 2019-04-03 DIAGNOSIS — I48.0 PAF (PAROXYSMAL ATRIAL FIBRILLATION) (HCC): Primary | ICD-10-CM

## 2019-04-03 PROCEDURE — 99213 OFFICE O/P EST LOW 20 MIN: CPT | Performed by: INTERNAL MEDICINE

## 2019-04-03 RX ORDER — DILTIAZEM HYDROCHLORIDE 120 MG/1
180 CAPSULE, COATED, EXTENDED RELEASE ORAL 2 TIMES DAILY
Qty: 180 CAPSULE | Refills: 5 | Status: SHIPPED | OUTPATIENT
Start: 2019-04-03 | End: 2020-05-26 | Stop reason: SDUPTHER

## 2019-04-03 NOTE — PROGRESS NOTES
Review of Systems   Constitutional: Negative for chills and fever  HENT: Negative for congestion, sinus pressure, sneezing and sore throat. Eyes: Negative for pain, discharge, redness and itching. Respiratory: Negative for apnea, cough  Gastrointestinal: Negative for blood in stool, constipation, diarrhea   Endocrine: Negative for cold intolerance, heat intolerance, polydipsia. Genitourinary: Negative for dysuria, enuresis, flank pain and hematuria. Musculoskeletal: Negative for arthralgias, joint swelling and neck pain. Neurological: Negative for numbness and headaches. Psychiatric/Behavioral: Negative for agitation, confusion, decreased concentration and dysphoric mood. Objective:     BP (!) 148/88   Pulse 92   Ht 6' 1\" (1.854 m)   Wt (!) 302 lb 3.2 oz (137.1 kg)   BMI 39.87 kg/m²     Wt Readings from Last 3 Encounters:   04/03/19 (!) 302 lb 3.2 oz (137.1 kg)   01/24/19 (!) 300 lb 3.2 oz (136.2 kg)   10/30/18 290 lb (131.5 kg)     BP Readings from Last 3 Encounters:   04/03/19 (!) 148/88   01/24/19 132/84   10/30/18 130/80       Nursing note and vitals reviewed. Physical Exam   Constitutional: Oriented to person, place, and time. Appears well-developed and well-nourished. HENT:   Head: Normocephalic and atraumatic. Eyes: EOM are normal. Pupils are equal, round, and reactive to light. Neck: Normal range of motion. Neck supple. No JVD present. Cardiovascular: Normal rate, regular rhythm, normal heart sounds and intact distal pulses. No murmur heard. Pulmonary/Chest: Effort normal and breath sounds normal. No respiratory distress. No wheezes. No rales. Abdominal: Soft. Bowel sounds are normal. No distension. There is no tenderness. Musculoskeletal: Normal range of motion. No edema. Neurological: Alert and oriented to person, place, and time. No cranial nerve deficit. Coordination normal.   Skin: Skin is warm and dry. Psychiatric: Normal mood and affect.        No results found for: CKTOTAL, CKMB, CKMBINDEX    Lab Results   Component Value Date    WBC 14.0 10/02/2018    RBC 4.71 10/02/2018    HGB 15.1 10/02/2018    HCT 44.8 10/02/2018    MCV 95.1 10/02/2018    MCH 32.1 10/02/2018    MCHC 33.7 10/02/2018    RDW 14.1 2017     10/02/2018    MPV 10.0 10/02/2018       Lab Results   Component Value Date     10/02/2018    K 4.9 10/15/2018     10/02/2018    CO2 24 10/02/2018    BUN 24 10/02/2018    LABALBU 4.1 10/02/2018    LABALBU 4.3 2012    CREATININE 1.8 10/02/2018    CALCIUM 9.2 10/02/2018    LABGLOM 41 10/02/2018    GLUCOSE 105 10/02/2018    GLUCOSE 92 2012       Lab Results   Component Value Date    ALKPHOS 67 10/02/2018    ALT 21 10/02/2018    AST 20 10/02/2018    PROT 6.7 10/02/2018    PROT 6.9 2013    BILITOT 0.7 10/02/2018    BILIDIR <0.2 10/01/2018    LABALBU 4.1 10/02/2018    LABALBU 4.3 2012       No results found for: MG    Lab Results   Component Value Date    INR 0.96 09/10/2018         No results found for: LABA1C    Lab Results   Component Value Date    TRIG 166 2012    HDL 32 2012    LDLCALC 105 2012       Lab Results   Component Value Date    TSH 3.170 2016         Testing Reviewed:      I have individually reviewed the cardiac test below:    ECHO:   Results for orders placed in visit on 11   ECHO Complete 2D W Doppler W Color    Narrative [address]  [address] [address] [place], [address]  Phone: [phone]  Fax: [phone]    Transthoracic Echocardiogram  2D, M-mode, Doppler, and Color Doppler    Name: Jared Salinas  : 1975  MR #: [id number]  Study date: 2011  Account #: [de-identified]  Age: 38 years  Gender: Male  Ht-Wt-BSA: 72.8 in- 277.2 lb- 2.47 mÂ²    Reading Physician:  Rosalia Cook  Technologist:  David Farris, RRT, RDCS  Referring Physician:  Rosalia Cook    Reason for study: Chest pain, shortness of breath and palpitations, AVR with rapid ventricular response. PROCEDURE: The procedure was performed at the bedside. The procedure was performed as an inpatient. This was a routine  study. The transthoracic approach was used. The study included complete 2D imaging, M-mode, complete spectral Doppler, and color Doppler. Systolic blood pressure was 105 mmHg, at the start of the study. Diastolic blood pressure was 67 mmHg, at the start of  the study. Image quality was adequate. LEFT VENTRICLE: The ventricle was mildly dilated. Systolic function was normal. Ejection fraction was estimated in the  range of 55 % to 65 %. There were no regional wall motion abnormalities. Wall thickness was normal. DOPPLER: Doppler  parameters were consistent with abnormal left ventricular relaxation (grade 1 diastolic dysfunction). AORTIC VALVE: The valve was trileaflet. Leaflets exhibited normal thickness and normal cuspal separation. DOPPLER:  Transaortic velocity was within the normal range. There was no evidence for stenosis. There was no regurgitation. AORTA: The root exhibited normal size. MITRAL VALVE: Valve structure was normal. There was normal leaflet separation. DOPPLER: The transmitral velocity was  within the normal range. There was no evidence for stenosis. There was trivial regurgitation. LEFT ATRIUM: Size was normal.    RIGHT VENTRICLE: The size was normal. Systolic function was normal. Wall thickness was normal.    PULMONIC VALVE: Leaflets exhibited normal thickness, no calcification, and normal cuspal separation. DOPPLER: The  transpulmonic velocity was within the normal range. There was no regurgitation. PULMONARY ARTERY: The size was normal. DOPPLER: Systolic pressure was within the normal range. TRICUSPID VALVE: The valve structure was normal. There was normal leaflet separation. DOPPLER: The transtricuspid  velocity was within the normal range. There was no evidence for stenosis. There was mild regurgitation.     RIGHT ATRIUM: Size was normal.    SYSTEMIC VEINS: IVC: The inferior vena cava was normal in size. PERICARDIUM: There was no pericardial effusion. The pericardium was normal in appearance. SYSTEM MEASUREMENT TABLES    2D mode  LA Dimension (2D): 2.8 cm  FS (2D-Cubed): 25 %  FS (2D-Teich): 25 %  IVSd (2D): 1 cm  IVSd; Mean chosen (2D): 1 cm  LVIDd (2D): 5.1 cm  LVIDs (2D): 3.8 cm  LVOT Area (2D): 3.1 cm2  LVPWd (2D): 0.9 cm  RVIDd (2D): 3.8 cm    M mode  AoR Diam (MM): 3 cm  AV Cusp Sep (MM): 2.2 cm  IVSd (MM): 1 cm  LVIDd (MM): 4.4 cm  LVIDs (MM): 3.3 cm  LVPWd (MM): 0.9 cm  MV D-E Exc: 1.9 cm  MV EF [place] (MM): 7.9 cm/s  RVIDd (MM): 3.4 cm    Unspecified Scan Mode  CV Orifice Area; Cont Eq by VTI: 2 cm2  VTI; Antegrade Flow: 21.9 cm  Vmax; Antegrade Flow: 1 m/s  LVOT Diam: 2 cm  LVOT Vmax: 68.7 cm/s  MV Peak A Harshil; Mean: 89.5 cm/s  MV Peak E Harshil; Antegrade Flow: 100 cm/s  Vmax; Antegrade Flow: 35.9 cm/s  Vmax; Mean; Antegrade Flow: 35.9 cm/s  TV Peak A Harshil: 35.5 cm/s  TV Peak E Harshil; Antegrade Flow: 61.7 cm/s    SUMMARY:    Left ventricle: The ventricle was mildly dilated. Systolic function was normal. Ejection fraction was estimated in the range of 55 % to 65 %. There were no regional wall motion abnormalities. Doppler parameters were consistent with abnormal left ventricular relaxation (grade 1 diastolic dysfunction). Tricuspid valve: There was mild regurgitation. Prepared and signed by    Desi Miguel DO  Signed 26-Jan-2011 11:14:39          Assessment/Plan   Afib s/p ablation at [facility] = 0-1 - tolerating ASA/Diltiazem. Monitor BP, if rising, will need to add HCTZ. Discussed diet/exercise/BP/weight loss/health lifestyle choices/lipids; the patient understands the goals and will try to comply.     Disposition:  1 year         Electronically signed by Oren Feldman MD   4/3/2019 at 9:59 AM

## 2019-12-30 ENCOUNTER — HOSPITAL ENCOUNTER (OUTPATIENT)
Dept: NON INVASIVE DIAGNOSTICS | Age: 44
Discharge: HOME | End: 2019-12-30
Payer: COMMERCIAL

## 2019-12-30 PROCEDURE — 93225 XTRNL ECG REC<48 HRS REC: CPT

## 2019-12-30 PROCEDURE — 93226 XTRNL ECG REC<48 HR SCAN A/R: CPT

## 2020-01-02 LAB
ACQUISITION DURATION: NORMAL S
AVERAGE HEART RATE: 87 BPM
FASTEST SUPRAVENTRICULAR RATE: 150 BPM
HOOKUP DATE: NORMAL
HOOKUP TIME: NORMAL
LONGEST SUPRAVENTRICULAR RATE: 150 BPM
MAX HEART RATE TIME/DATE: NORMAL
MAX HEART RATE: 163 BPM
MIN HEART RATE TIME/DATE: NORMAL
MIN HEART RATE: 50 BPM
NUMBER OF FASTEST SUPRAVENTRICULAR BEATS: 3
NUMBER OF LONGEST SUPRAVENTRICULAR BEATS: 3
NUMBER OF QRS COMPLEXES: NORMAL
NUMBER OF SUPRAVENTRICULAR BEATS IN RUNS: 3
NUMBER OF SUPRAVENTRICULAR COUPLETS: 0
NUMBER OF SUPRAVENTRICULAR ECTOPICS: 50
NUMBER OF SUPRAVENTRICULAR ISOLATED BEATS: 47
NUMBER OF SUPRAVENTRICULAR RUNS: 1
NUMBER OF VENTRICULAR BEATS IN RUNS: 0
NUMBER OF VENTRICULAR BIGEMINAL CYCLES: 22
NUMBER OF VENTRICULAR COUPLETS: 13
NUMBER OF VENTRICULAR ECTOPICS: 1270
NUMBER OF VENTRICULAR ISOLATED BEATS: 1244
NUMBER OF VENTRICULAR RUNS: 0

## 2020-01-14 ENCOUNTER — OFFICE VISIT (OUTPATIENT)
Dept: CARDIOLOGY CLINIC | Age: 45
End: 2020-01-14
Payer: COMMERCIAL

## 2020-01-14 VITALS
DIASTOLIC BLOOD PRESSURE: 78 MMHG | HEART RATE: 75 BPM | BODY MASS INDEX: 41.75 KG/M2 | HEIGHT: 73 IN | SYSTOLIC BLOOD PRESSURE: 126 MMHG | WEIGHT: 315 LBS

## 2020-01-14 PROCEDURE — 99213 OFFICE O/P EST LOW 20 MIN: CPT | Performed by: PHYSICIAN ASSISTANT

## 2020-01-14 PROCEDURE — 93000 ELECTROCARDIOGRAM COMPLETE: CPT | Performed by: PHYSICIAN ASSISTANT

## 2020-01-14 NOTE — PROGRESS NOTES
[facility] PROFESSIONAL SERVICES  [facility] [address]   [address] [address]   Dept: [phone]   Dept Fax: [phone]   Loc: [phone]      Chief Complaint   Patient presents with    Follow-up     Atrial Fib     Patient with a history of paroxysmal atrial fibrillation and A. fib ablation in the past presents with complaint of increased palpitations. Patient recently had a Holter monitor which revealed intermittent PACs and PVCs. It did not show any atrial fibrillation. He complains of almost daily palpitations. He states he has never taken metoprolol as far as he knows in the past.  Cardiologist:  Dr. Dung Potts:   No fever, no chills, No fatigue or weight loss  Pulmonary:    No dyspnea, no wheezing  Cardiac:    Denies recent chest pain   GI:     No nausea or vomiting, no abdominal pain  Neuro:    No dizziness or light headedness  Musculoskeletal:  No recent active issues  Extremities:   No edema, good peripheral pulses      Past Medical History:   Diagnosis Date    Arthritis     Atrial fibrillation ([address]     CAD (coronary artery disease)     Chest pain     Heart palpitations     Hypertension     Nausea & vomiting     PONV (postoperative nausea and vomiting)     Sleep apnea     wears cpap       No Known Allergies    Current Outpatient Medications   Medication Sig Dispense Refill    metoprolol tartrate (LOPRESSOR) 25 MG tablet Take 1 tablet by mouth 2 times daily 60 tablet 3    diltiazem (CARDIZEM CD) 120 MG extended release capsule Take 2 capsules by mouth 2 times daily 180 capsule 5     No current facility-administered medications for this visit.         Social History     Socioeconomic History    Marital status:      Spouse name: None    Number of children: None    Years of education: None    Highest education level: None   Occupational History    None   Social Needs    Financial resource strain: None    Food insecurity: Worry: None     Inability: None    Transportation needs:     Medical: None     Non-medical: None   Tobacco Use    Smoking status: Former Smoker     Packs/day: 1.00     Years: 22.00     Pack years: 22.00     Types: Cigarettes     Last attempt to quit: 10/25/2018     Years since quittin.2    Smokeless tobacco: Never Used    Tobacco comment: stated quit at the end of 2018   Substance and Sexual Activity    Alcohol use: No    Drug use: No    Sexual activity: Yes     Partners: Female   Lifestyle    Physical activity:     Days per week: None     Minutes per session: None    Stress: None   Relationships    Social connections:     Talks on phone: None     Gets together: None     Attends L[employer] service: None     Active member of club or organization: None     Attends meetings of clubs or organizations: None     Relationship status: None    Intimate partner violence:     Fear of current or ex partner: None     Emotionally abused: None     Physically abused: None     Forced sexual activity: None   Other Topics Concern    None   Social History Narrative    None       Family History   Problem Relation Age of Onset    Heart Disease Father        Blood pressure 126/78, pulse 75, height 6' 1\" (1.854 m), weight (!) 315 lb 9.6 oz (143.2 kg). General:   Well developed, well nourished  Lungs:   Clear to auscultation  Heart:    Normal S1 S2, No murmur, rubs, or gallops  Abdomen:   Soft, non tender, no organomegalies, positive bowel sounds  Extremities:   No edema, no cyanosis, good peripheral pulses  Neurological:   Awake, alert, oriented. No obvious focal deficits  Musculoskeletal:  No obvious deformities        Holter monitor 2019  HOLTER MONITOR:  48 Hours      INDICATION FOR STUDY:  Palpitations     CONCLUSION:  Abnormal Holter. Occasional PVC's and infrequent PAC's.   The patient did report having palpitations during the  monitoring period but  the symptoms did not consistently correlate with an arrhythmia.          Diagnosis Orders   1. Heart palpitations  EKG 12 lead   2. PAF (paroxysmal atrial fibrillation) ([address]     3. PAC (premature atrial contraction)         Orders Placed This Encounter   Procedures    EKG 12 lead     Order Specific Question:   Reason for Exam?     Answer: Other     Continue Dr Honey Robles current treatment plan    Start metoprolol 25 mg twice a day    Continue all other current medications and with constant vigilance to changes in symptoms and also any potential side effects. Return for care if become worse or seek medical attention immediately. The patient is educated on symptoms of heart disease that include chest pain and passing out, dizziness, etc and to report them if there is any change or go to emergency room.      Follow up with Dr Kaba/myself in 3 months or sooner if needed  (Please note that portions of this note were completed with a voice recognition program.  Efforts were made to edit the dictation but occasionally words are mis-transcribed.)      Irene Dean PA-C

## 2020-02-10 ENCOUNTER — APPOINTMENT (OUTPATIENT)
Dept: GENERAL RADIOLOGY | Age: 45
End: 2020-02-10
Payer: COMMERCIAL

## 2020-02-10 ENCOUNTER — HOSPITAL ENCOUNTER (EMERGENCY)
Age: 45
Discharge: HOME | End: 2020-02-10
Payer: COMMERCIAL

## 2020-02-10 VITALS
HEART RATE: 70 BPM | RESPIRATION RATE: 18 BRPM | OXYGEN SATURATION: 97 % | SYSTOLIC BLOOD PRESSURE: 134 MMHG | TEMPERATURE: 98 F | BODY MASS INDEX: 41.08 KG/M2 | WEIGHT: 310 LBS | HEIGHT: 73 IN | DIASTOLIC BLOOD PRESSURE: 93 MMHG

## 2020-02-10 PROCEDURE — 99281 EMR DPT VST MAYX REQ PHY/QHP: CPT

## 2020-02-10 PROCEDURE — 72100 X-RAY EXAM L-S SPINE 2/3 VWS: CPT

## 2020-02-10 RX ORDER — CYCLOBENZAPRINE HCL 10 MG
10 TABLET ORAL 3 TIMES DAILY PRN
Qty: 21 TABLET | Refills: 0 | Status: SHIPPED | OUTPATIENT
Start: 2020-02-10 | End: 2020-02-20

## 2020-02-10 RX ORDER — IBUPROFEN 800 MG/1
800 TABLET ORAL EVERY 6 HOURS PRN
Qty: 60 TABLET | Refills: 0 | Status: SHIPPED | OUTPATIENT
Start: 2020-02-10

## 2020-02-10 ASSESSMENT — PAIN DESCRIPTION - ORIENTATION: ORIENTATION: LOWER;MID

## 2020-02-10 ASSESSMENT — PAIN DESCRIPTION - LOCATION: LOCATION: BACK

## 2020-02-10 ASSESSMENT — PAIN SCALES - GENERAL: PAINLEVEL_OUTOF10: 5

## 2020-02-10 ASSESSMENT — PAIN DESCRIPTION - PAIN TYPE: TYPE: ACUTE PAIN

## 2020-02-10 ASSESSMENT — ENCOUNTER SYMPTOMS: BACK PAIN: 1

## 2020-02-10 NOTE — ED PROVIDER NOTES
2 times daily, Disp-60 tablet, R-3Normal      diltiazem (CARDIZEM CD) 120 MG extended release capsule Take 2 capsules by mouth 2 times daily, Disp-180 capsule, R-5Normal             ALLERGIES     has No Known Allergies. FAMILY HISTORY     He indicated that his mother is alive. He indicated that his father is . He indicated that his sister is alive. He indicated that only one of his two brothers is alive. family history includes Heart Disease in his father. SOCIAL HISTORY      reports that he quit smoking about 15 months ago. His smoking use included cigarettes. He has a 22.00 pack-year smoking history. He has never used smokeless tobacco. He reports that he does not drink alcohol or use drugs. PHYSICAL EXAM     INITIAL VITALS:  height is 6' 1\" (1.854 m) and weight is 310 lb (140.6 kg) (abnormal). His oral temperature is 98 °F (36.7 °C). His blood pressure is 134/93 (abnormal) and his pulse is 70. His respiration is 18 and oxygen saturation is 97%. Physical Exam  Vitals signs and nursing note reviewed. Constitutional:       Appearance: He is well-developed. He is not toxic-appearing or diaphoretic. HENT:      Head: Normocephalic and atraumatic. Right Ear: Tympanic membrane and external ear normal.      Left Ear: Tympanic membrane and external ear normal.      Nose: Nose normal.      Mouth/Throat:      Pharynx: No oropharyngeal exudate or posterior oropharyngeal erythema. Eyes:      Conjunctiva/sclera: Conjunctivae normal.   Neck:      Musculoskeletal: Normal range of motion and neck supple. Vascular: No JVD. Cardiovascular:      Rate and Rhythm: Normal rate and regular rhythm. Pulses: Normal pulses. Heart sounds: Normal heart sounds. No murmur. No friction rub. No gallop. Pulmonary:      Effort: Pulmonary effort is normal. No respiratory distress. Breath sounds: Normal breath sounds. No decreased breath sounds, wheezing, rhonchi or rales.    Abdominal: CARE:   none     CONSULTS:  none    PROCEDURES:  none    FINAL IMPRESSION      1. Strain of lumbar region, initial encounter          DISPOSITION/PLAN   Discharge    PATIENT REFERRED TO:  Mohinder Gonzales   [address]  [phone]    Schedule an appointment as soon as possible for a visit         DISCHARGE MEDICATIONS:  Discharge Medication List as of 2/10/2020  1:26 PM      START taking these medications    Details   ibuprofen (ADVIL;MOTRIN) 800 MG tablet Take 1 tablet by mouth every 6 hours as needed for Pain, Disp-60 tablet, R-0Print      cyclobenzaprine (FLEXERIL) 10 MG tablet Take 1 tablet by mouth 3 times daily as needed for Muscle spasms, Disp-21 tablet, R-0Print             (Please note that portions of this note were completed with a voice recognition program.  Efforts were made to edit the dictations but occasionally words are mis-transcribed.)    The patient was given an opportunity to see the Emergency Attending. The patient voiced understanding that I was a Mid-LevelProvider and was in agreement with being seen independently by myself. Scribe:  Rocky Mendoza 2/10/20 1:35 PM Scribing for and in the presence of Wilma Green CNP. Signed by: Sharon Polo(Name), Melissa, 02/10/20 2:27 PM    Provider:  I personally performed the services described in the documentation, reviewed and edited the documentation which was dictated to the scribe in my presence, and it accurately records my words and actions.     Wilma Green CNP 2/10/20 2:27 PM        MAHNAZ Hernandez - CNP  02/10/20 [phone]

## 2020-02-10 NOTE — ED NOTES
Patient presents to the ED with complaints of mid lower back pain. No known injury to the area. He has no other complaints at this time.       Maximiliano Rizvi, LPN  78/[id number]

## 2020-04-14 ENCOUNTER — TELEPHONE (OUTPATIENT)
Dept: CARDIOLOGY CLINIC | Age: 45
End: 2020-04-14

## 2020-04-14 NOTE — LETTER
[address] [facility]  [address] [address]  Phone: [phone]  Fax: [phone]      April 14, 2020     [facility] [address] [place] [id number]      Dear Inez Roe:    I am writing you because I have been informed of your missed appointment(s). We care about you and the management of your healthcare and want to make sure that you follow up as recommended. We're sorry you were unable to keep your appointment and hope that you are doing well. We would like to continue treating your healthcare needs. Please call the office to let us know your plans for treatment and to reschedule your appointment.      Sincerely,        Wu Castillo PA-C

## 2020-05-26 ENCOUNTER — TELEPHONE (OUTPATIENT)
Dept: CARDIOLOGY CLINIC | Age: 45
End: 2020-05-26

## 2020-05-27 RX ORDER — DILTIAZEM HYDROCHLORIDE 120 MG/1
180 CAPSULE, COATED, EXTENDED RELEASE ORAL 2 TIMES DAILY
Qty: 180 CAPSULE | Refills: 0 | Status: SHIPPED | OUTPATIENT
Start: 2020-05-27 | End: 2020-06-01

## 2020-05-27 NOTE — TELEPHONE ENCOUNTER
[facility] calling to verify directions on pt's diltiazem. They got a new script today but wrong quantity or wrong dose. . what should he be on ?   Please send a new rx

## 2020-05-28 RX ORDER — DILTIAZEM HYDROCHLORIDE 180 MG/1
180 CAPSULE, EXTENDED RELEASE ORAL DAILY
COMMUNITY
End: 2020-06-01 | Stop reason: SDUPTHER

## 2020-05-28 NOTE — TELEPHONE ENCOUNTER
Deann Jim called back from [facility] and Pt has been on taking 180 mg 1x daily, Dr Vilma De La Garza wrote the last and before that it was 180mg 2x daily. The one that was for 180 2 caps 2 x daily was only filled 1 time back in April of 2019. This was written by Opal Estrada. Please advise pharmacy.

## 2020-06-01 RX ORDER — DILTIAZEM HYDROCHLORIDE 180 MG/1
180 CAPSULE, EXTENDED RELEASE ORAL 2 TIMES DAILY
Qty: 180 CAPSULE | Refills: 1 | Status: SHIPPED | OUTPATIENT
Start: 2020-06-01 | End: 2020-12-03 | Stop reason: SDUPTHER

## 2020-06-23 ENCOUNTER — OFFICE VISIT (OUTPATIENT)
Dept: CARDIOLOGY CLINIC | Age: 45
End: 2020-06-23
Payer: COMMERCIAL

## 2020-06-23 VITALS
WEIGHT: 315 LBS | HEIGHT: 73 IN | DIASTOLIC BLOOD PRESSURE: 70 MMHG | HEART RATE: 84 BPM | BODY MASS INDEX: 41.75 KG/M2 | SYSTOLIC BLOOD PRESSURE: 138 MMHG

## 2020-06-23 PROCEDURE — 99213 OFFICE O/P EST LOW 20 MIN: CPT | Performed by: NURSE PRACTITIONER

## 2020-06-23 RX ORDER — METOPROLOL SUCCINATE 100 MG/1
100 TABLET, EXTENDED RELEASE ORAL DAILY
Qty: 90 TABLET | Refills: 3 | Status: SHIPPED | OUTPATIENT
Start: 2020-06-23 | End: 2021-06-14 | Stop reason: SDUPTHER

## 2020-06-23 RX ORDER — HYDROCHLOROTHIAZIDE 25 MG/1
25 TABLET ORAL DAILY
COMMUNITY

## 2020-12-03 ENCOUNTER — OFFICE VISIT (OUTPATIENT)
Dept: CARDIOLOGY CLINIC | Age: 45
End: 2020-12-03
Payer: COMMERCIAL

## 2020-12-03 VITALS
HEIGHT: 73 IN | HEART RATE: 80 BPM | SYSTOLIC BLOOD PRESSURE: 138 MMHG | DIASTOLIC BLOOD PRESSURE: 82 MMHG | BODY MASS INDEX: 41.75 KG/M2 | WEIGHT: 315 LBS

## 2020-12-03 PROCEDURE — 99213 OFFICE O/P EST LOW 20 MIN: CPT | Performed by: INTERNAL MEDICINE

## 2020-12-03 PROCEDURE — 93000 ELECTROCARDIOGRAM COMPLETE: CPT | Performed by: INTERNAL MEDICINE

## 2020-12-03 RX ORDER — NITROGLYCERIN 0.4 MG/1
0.4 TABLET SUBLINGUAL EVERY 5 MIN PRN
Qty: 25 TABLET | Refills: 3 | Status: SHIPPED | OUTPATIENT
Start: 2020-12-03

## 2020-12-03 RX ORDER — ASPIRIN 81 MG/1
81 TABLET ORAL DAILY
COMMUNITY

## 2020-12-03 RX ORDER — DILTIAZEM HYDROCHLORIDE 180 MG/1
180 CAPSULE, EXTENDED RELEASE ORAL 2 TIMES DAILY
Qty: 180 CAPSULE | Refills: 3 | Status: SHIPPED | OUTPATIENT
Start: 2020-12-03 | End: 2021-08-09 | Stop reason: SDUPTHER

## 2020-12-03 NOTE — PROGRESS NOTES
Follow-up. He has intermittent  midsternal chest pressure without any radiation. He also has intermittent shortness of breath associated with the chest pressure. EKG completed.

## 2020-12-03 NOTE — PROGRESS NOTES
[facility],[place] [address] [address] [place] [address]  Dept: [phone]  Dept Fax: [phone]  Loc: [phone]    Visit Date: 12/3/2020    Mr. Roshan Kaplan is a 50 y.o. male  who presented for:  Chief Complaint   Patient presents with    6 Month Follow-Up    Atrial Fibrillation       HPI:   HPI   49 yo M hx of Afib s/p ablation at [facility], who presents with chest pressure and a feeling of uncomfortableness, center of his chest, can last up to 1 hour, 6-7/10. No diaphoresis, gets sob. Can happen at rest or with exertion. No syncope. Taking ASA. No CVA issue. Sometimes he thinks he has few beats of Afib, but not sure. ECG wnl. No ED visits. Compliant with meds. Previously preserved EF. Out of 7 days, will have 4-5 days of symptoms. Notices abnormal heart beats when the symptoms occur. Current Outpatient Medications:     aspirin EC 81 MG EC tablet, Take 81 mg by mouth daily, Disp: , Rfl:     hydroCHLOROthiazide (HYDRODIURIL) 25 MG tablet, Take 25 mg by mouth daily, Disp: , Rfl:     metoprolol succinate (TOPROL XL) 100 MG extended release tablet, Take 1 tablet by mouth daily, Disp: 90 tablet, Rfl: 3    dilTIAZem (DILACOR XR) 180 MG extended release capsule, Take 1 capsule by mouth 2 times daily, Disp: 180 capsule, Rfl: 1    ibuprofen (ADVIL;MOTRIN) 800 MG tablet, Take 1 tablet by mouth every 6 hours as needed for Pain, Disp: 60 tablet, Rfl: 0    Past Medical History  Jorge  has a past medical history of Arthritis, Atrial fibrillation ([address] CAD (coronary artery disease), Chest pain, Heart palpitations, Hypertension, Nausea & vomiting, PONV (postoperative nausea and vomiting), and Sleep apnea. Social History  Jorge  reports that he quit smoking about 2 years ago. His smoking use included cigarettes. He has a 22.00 pack-year smoking history.  He has never used smokeless tobacco. He reports that he does not drink alcohol or use heart sounds and intact distal pulses. No murmur heard. Pulmonary/Chest: Effort normal and breath sounds normal. No respiratory distress. No wheezes. No rales. Abdominal: Soft. Bowel sounds are normal. No distension. There is no tenderness. Musculoskeletal: Normal range of motion. No edema. Neurological: Alert and oriented to person, place, and time. No cranial nerve deficit. Coordination normal.   Skin: Skin is warm and dry. Psychiatric: Normal mood and affect.        No results found for: CKTOTAL, CKMB, CKMBINDEX    Lab Results   Component Value Date    WBC 6.4 01/06/2020    WBC 14.0 10/02/2018    RBC 5.13 01/06/2020    HGB 16.4 01/06/2020    HCT 49.0 01/06/2020    MCV 96 01/06/2020    MCH 32.0 01/06/2020    MCHC 33.5 01/06/2020    RDW 14.2 01/06/2020     01/06/2020     10/02/2018    MPV 8.6 01/06/2020       Lab Results   Component Value Date     01/06/2020    K 4.4 01/06/2020     01/06/2020    CO2 27 01/06/2020    BUN 16 01/06/2020    LABALBU 4.4 09/11/2020    LABALBU 4.3 05/31/2012    CREATININE 1.09 01/06/2020    CALCIUM 9.5 01/06/2020    LABGLOM 41 10/02/2018    GLUCOSE 113 01/06/2020       Lab Results   Component Value Date    ALKPHOS 68 09/11/2020    ALKPHOS 67 10/02/2018    ALT 62 09/11/2020    AST 43 09/11/2020    PROT 7.1 09/11/2020    PROT 6.9 09/20/2013    BILITOT 0.8 09/11/2020    BILIDIR 0.2 09/11/2020    LABALBU 4.4 09/11/2020    LABALBU 4.3 05/31/2012       No results found for: MG    Lab Results   Component Value Date    INR 0.96 09/10/2018         Lab Results   Component Value Date    LABA1C 6.2 09/11/2020       Lab Results   Component Value Date    TRIG 160 01/06/2020    HDL 40 01/06/2020    LDLCALC 99 01/06/2020    LABVLDL 32 01/06/2020       Lab Results   Component Value Date    TSH 1.56 01/06/2020         Testing Reviewed:      I have individually reviewed the cardiac test below:    ECHO:   Results for orders placed in visit on 01/26/11   ECHO Complete 2D W Doppler W Color    Narrative [address] [place] [address]  [address] [address], [address]  Phone: [phone]  Fax: [phone]    Transthoracic Echocardiogram  2D, M-mode, Doppler, and Color Doppler    Name: Dave Cox  : [id number]  MR #: [id number]  Study date: 2011  Account #: [de-identified]  Age: 38 years  Gender: Male  Ht-Wt-BSA: 72.8 in- 277.2 lb- 2.47 mÂ²    Reading Physician:  Nuno Ray DO  Technologist:  Zaynab Thorpe, RRT, RDCS  Referring Physician:  Nuno Ray DO    Reason for study: Chest pain, shortness of breath and palpitations, AVR with rapid ventricular response. PROCEDURE: The procedure was performed at the bedside. The procedure was performed as an inpatient. This was a routine  study. The transthoracic approach was used. The study included complete 2D imaging, M-mode, complete spectral Doppler, and color Doppler. Systolic blood pressure was 105 mmHg, at the start of the study. Diastolic blood pressure was 67 mmHg, at the start of  the study. Image quality was adequate. LEFT VENTRICLE: The ventricle was mildly dilated. Systolic function was normal. Ejection fraction was estimated in the  range of 55 % to 65 %. There were no regional wall motion abnormalities. Wall thickness was normal. DOPPLER: Doppler  parameters were consistent with abnormal left ventricular relaxation (grade 1 diastolic dysfunction). AORTIC VALVE: The valve was trileaflet. Leaflets exhibited normal thickness and normal cuspal separation. DOPPLER:  Transaortic velocity was within the normal range. There was no evidence for stenosis. There was no regurgitation. AORTA: The root exhibited normal size. MITRAL VALVE: Valve structure was normal. There was normal leaflet separation. DOPPLER: The transmitral velocity was  within the normal range. There was no evidence for stenosis. There was trivial regurgitation.     LEFT ATRIUM: Size was normal.    RIGHT VENTRICLE: The size was normal. Systolic function was normal. Wall thickness was normal.    PULMONIC VALVE: Leaflets exhibited normal thickness, no calcification, and normal cuspal separation. DOPPLER: The  transpulmonic velocity was within the normal range. There was no regurgitation. PULMONARY ARTERY: The size was normal. DOPPLER: Systolic pressure was within the normal range. TRICUSPID VALVE: The valve structure was normal. There was normal leaflet separation. DOPPLER: The transtricuspid  velocity was within the normal range. There was no evidence for stenosis. There was mild regurgitation. RIGHT ATRIUM: Size was normal.    SYSTEMIC VEINS: IVC: The inferior vena cava was normal in size. PERICARDIUM: There was no pericardial effusion. The pericardium was normal in appearance. SYSTEM MEASUREMENT TABLES    2D mode  LA Dimension (2D): 2.8 cm  FS (2D-Cubed): 25 %  FS (2D-Teich): 25 %  IVSd (2D): 1 cm  IVSd; Mean chosen (2D): 1 cm  LVIDd (2D): 5.1 cm  LVIDs (2D): 3.8 cm  LVOT Area (2D): 3.1 cm2  LVPWd (2D): 0.9 cm  RVIDd (2D): 3.8 cm    M mode  AoR Diam (MM): 3 cm  AV Cusp Sep (MM): 2.2 cm  IVSd (MM): 1 cm  LVIDd (MM): 4.4 cm  LVIDs (MM): 3.3 cm  LVPWd (MM): 0.9 cm  MV D-E Exc: 1.9 cm  MV EF Fannin (MM): 7.9 cm/s  RVIDd (MM): 3.4 cm    Unspecified Scan Mode  CV Orifice Area; Cont Eq by VTI: 2 cm2  VTI; Antegrade Flow: 21.9 cm  Vmax; Antegrade Flow: 1 m/s  LVOT Diam: 2 cm  LVOT Vmax: 68.7 cm/s  MV Peak A Harshil; Mean: 89.5 cm/s  MV Peak E Harshil; Antegrade Flow: 100 cm/s  Vmax; Antegrade Flow: 35.9 cm/s  Vmax; Mean; Antegrade Flow: 35.9 cm/s  TV Peak A Harshil: 35.5 cm/s  TV Peak E Harshil; Antegrade Flow: 61.7 cm/s    SUMMARY:    Left ventricle: The ventricle was mildly dilated. Systolic function was normal. Ejection fraction was estimated in the range of 55 % to 65 %. There were no regional wall motion abnormalities. Doppler parameters were consistent with abnormal left ventricular relaxation (grade 1 diastolic dysfunction). Tricuspid valve:   There was mild regurgitation. Prepared and signed by    Jayjay Jauregui DO  Signed 26-Jan-2011 11:14:39          Assessment/Plan   Chest pain/pressure  Afib s/p ablation at [facility], [address] [facility] 0-1  Check Lexiscan to evaluate, morbid obesity limits exercise capacity. NTG SL prn given. Discussed symptoms needing emergency care. Limit exertion. Discussed diet/exercise/BP/weight loss/health lifestyle choices/lipids; the patient understands the goals and will try to comply.     Disposition:  6-12 months, earlier based on testing         Electronically signed by Pam Calvert MD   12/3/2020 at 11:37 AM EST

## 2020-12-14 ENCOUNTER — HOSPITAL ENCOUNTER (OUTPATIENT)
Dept: NON INVASIVE DIAGNOSTICS | Age: 45
Discharge: HOME | End: 2020-12-14
Payer: COMMERCIAL

## 2020-12-14 PROCEDURE — 78452 HT MUSCLE IMAGE SPECT MULT: CPT

## 2020-12-14 PROCEDURE — 6360000002 HC RX W HCPCS

## 2020-12-14 PROCEDURE — A9500 TC99M SESTAMIBI: HCPCS | Performed by: INTERNAL MEDICINE

## 2020-12-14 PROCEDURE — 3430000000 HC RX DIAGNOSTIC RADIOPHARMACEUTICAL: Performed by: INTERNAL MEDICINE

## 2020-12-14 PROCEDURE — 93017 CV STRESS TEST TRACING ONLY: CPT | Performed by: INTERNAL MEDICINE

## 2020-12-14 RX ADMIN — Medication 9.9 MILLICURIE: at 12:00

## 2020-12-14 RX ADMIN — Medication 33.1 MILLICURIE: at 13:00

## 2020-12-16 PROCEDURE — 78452 HT MUSCLE IMAGE SPECT MULT: CPT | Performed by: INTERNAL MEDICINE

## 2020-12-16 PROCEDURE — 93018 CV STRESS TEST I&R ONLY: CPT | Performed by: INTERNAL MEDICINE

## 2020-12-16 PROCEDURE — 93016 CV STRESS TEST SUPVJ ONLY: CPT | Performed by: INTERNAL MEDICINE

## 2021-06-14 RX ORDER — METOPROLOL SUCCINATE 100 MG/1
100 TABLET, EXTENDED RELEASE ORAL DAILY
Qty: 90 TABLET | Refills: 0 | Status: SHIPPED | OUTPATIENT
Start: 2021-06-14

## 2021-06-14 NOTE — TELEPHONE ENCOUNTER
Jaquelin Becton called requesting a refill on the following medications:  Requested Prescriptions     Pending Prescriptions Disp Refills    metoprolol succinate (TOPROL XL) 100 MG extended release tablet 90 tablet 3     Sig: Take 1 tablet by mouth daily     Pharmacy verified: [facility] in [facility]  . pv      Date of last visit: 12/03/2020  Date of next visit (if applicable): 3/1/3842

## 2021-07-09 ENCOUNTER — APPOINTMENT (OUTPATIENT)
Dept: GENERAL RADIOLOGY | Age: 46
End: 2021-07-09
Payer: COMMERCIAL

## 2021-07-09 ENCOUNTER — HOSPITAL ENCOUNTER (EMERGENCY)
Age: 46
Discharge: HOME | End: 2021-07-09
Attending: INTERNAL MEDICINE
Payer: COMMERCIAL

## 2021-07-09 VITALS
RESPIRATION RATE: 18 BRPM | OXYGEN SATURATION: 96 % | HEART RATE: 83 BPM | DIASTOLIC BLOOD PRESSURE: 91 MMHG | SYSTOLIC BLOOD PRESSURE: 147 MMHG

## 2021-07-09 DIAGNOSIS — S61.209A DEGLOVING INJURY OF FINGER, INITIAL ENCOUNTER: Primary | ICD-10-CM

## 2021-07-09 DIAGNOSIS — S68.119A AMPUTATION FINGER, INITIAL ENCOUNTER: ICD-10-CM

## 2021-07-09 PROCEDURE — 96375 TX/PRO/DX INJ NEW DRUG ADDON: CPT

## 2021-07-09 PROCEDURE — 90714 TD VACC NO PRESV 7 YRS+ IM: CPT | Performed by: INTERNAL MEDICINE

## 2021-07-09 PROCEDURE — 73110 X-RAY EXAM OF WRIST: CPT

## 2021-07-09 PROCEDURE — 96372 THER/PROPH/DIAG INJ SC/IM: CPT

## 2021-07-09 PROCEDURE — 90471 IMMUNIZATION ADMIN: CPT | Performed by: INTERNAL MEDICINE

## 2021-07-09 PROCEDURE — 6360000002 HC RX W HCPCS: Performed by: INTERNAL MEDICINE

## 2021-07-09 PROCEDURE — 6370000000 HC RX 637 (ALT 250 FOR IP)

## 2021-07-09 PROCEDURE — 99283 EMERGENCY DEPT VISIT LOW MDM: CPT

## 2021-07-09 PROCEDURE — 26951 AMPUTATION OF FINGER/THUMB: CPT

## 2021-07-09 PROCEDURE — 96365 THER/PROPH/DIAG IV INF INIT: CPT

## 2021-07-09 PROCEDURE — 6360000002 HC RX W HCPCS

## 2021-07-09 PROCEDURE — 73130 X-RAY EXAM OF HAND: CPT

## 2021-07-09 PROCEDURE — 6820000001 HC L2 TRAUMA SURGERY EVALUATION: Performed by: ORTHOPAEDIC SURGERY

## 2021-07-09 RX ORDER — ONDANSETRON 2 MG/ML
4 INJECTION INTRAMUSCULAR; INTRAVENOUS ONCE
Status: COMPLETED | OUTPATIENT
Start: 2021-07-09 | End: 2021-07-09

## 2021-07-09 RX ORDER — HYDROCODONE BITARTRATE AND ACETAMINOPHEN 5; 325 MG/1; MG/1
TABLET ORAL
Status: COMPLETED
Start: 2021-07-09 | End: 2021-07-09

## 2021-07-09 RX ORDER — HYDROCODONE BITARTRATE AND ACETAMINOPHEN 5; 325 MG/1; MG/1
1-2 TABLET ORAL
Qty: 40 TABLET | Refills: 0 | Status: SHIPPED | OUTPATIENT
Start: 2021-07-09 | End: 2021-07-16

## 2021-07-09 RX ORDER — MORPHINE SULFATE 4 MG/ML
INJECTION, SOLUTION INTRAMUSCULAR; INTRAVENOUS
Status: COMPLETED
Start: 2021-07-09 | End: 2021-07-09

## 2021-07-09 RX ORDER — MORPHINE SULFATE 2 MG/ML
2 INJECTION, SOLUTION INTRAMUSCULAR; INTRAVENOUS ONCE
Status: DISCONTINUED | OUTPATIENT
Start: 2021-07-09 | End: 2021-07-09

## 2021-07-09 RX ORDER — CEPHALEXIN 500 MG/1
500 CAPSULE ORAL 2 TIMES DAILY
Qty: 20 CAPSULE | Refills: 0 | Status: SHIPPED | OUTPATIENT
Start: 2021-07-09 | End: 2021-07-19

## 2021-07-09 RX ORDER — BUPIVACAINE HYDROCHLORIDE 5 MG/ML
INJECTION, SOLUTION EPIDURAL; INTRACAUDAL
Status: DISCONTINUED
Start: 2021-07-09 | End: 2021-07-09 | Stop reason: HOSPADM

## 2021-07-09 RX ORDER — MORPHINE SULFATE 4 MG/ML
4 INJECTION, SOLUTION INTRAMUSCULAR; INTRAVENOUS ONCE
Status: COMPLETED | OUTPATIENT
Start: 2021-07-09 | End: 2021-07-09

## 2021-07-09 RX ORDER — HYDROCODONE BITARTRATE AND ACETAMINOPHEN 5; 325 MG/1; MG/1
1 TABLET ORAL ONCE
Status: COMPLETED | OUTPATIENT
Start: 2021-07-09 | End: 2021-07-09

## 2021-07-09 RX ORDER — ONDANSETRON 2 MG/ML
INJECTION INTRAMUSCULAR; INTRAVENOUS
Status: COMPLETED
Start: 2021-07-09 | End: 2021-07-09

## 2021-07-09 RX ADMIN — CLOSTRIDIUM TETANI TOXOID ANTIGEN (FORMALDEHYDE INACTIVATED) AND CORYNEBACTERIUM DIPHTHERIAE TOXOID ANTIGEN (FORMALDEHYDE INACTIVATED) 0.5 ML: 5; 2 INJECTION, SUSPENSION INTRAMUSCULAR at 16:14

## 2021-07-09 RX ADMIN — ONDANSETRON 4 MG: 2 INJECTION INTRAMUSCULAR; INTRAVENOUS at 15:55

## 2021-07-09 RX ADMIN — MORPHINE SULFATE 4 MG: 4 INJECTION, SOLUTION INTRAMUSCULAR; INTRAVENOUS at 15:55

## 2021-07-09 RX ADMIN — HYDROCODONE BITARTRATE AND ACETAMINOPHEN 1 TABLET: 5; 325 TABLET ORAL at 17:54

## 2021-07-09 RX ADMIN — CEFAZOLIN SODIUM 2000 MG: 10 INJECTION, POWDER, FOR SOLUTION INTRAVENOUS at 15:55

## 2021-07-09 ASSESSMENT — PAIN SCALES - GENERAL
PAINLEVEL_OUTOF10: 6
PAINLEVEL_OUTOF10: 9

## 2021-07-09 NOTE — OP NOTE
Operative Note      Patient: Sanchez Thomason  YOB: 1975  MRN: [id number]    Date of Procedure: 7/9/2021    Pre-Op Diagnosis: Degloving right long finger    Post-Op Diagnosis: Same       Procedure:  Revision amputation right long finger to the middle of the proximal phalanx    Surgeon(s):  David Falk MD    Assistant:   Blanquita Biggs PA-C, Claude Paganini, PA-C, Ida Galloway PA-C  Anesthesia: Local    Estimated Blood Loss (mL): less than 50     Complications: None    Specimens:   * No specimens in log *    Implants:  * No surgical log found *      Drains: * No LDAs found *    Findings: Complete degloving of the right long finger not salvageable    Detailed Description of Procedure: Indications  This 42-year-old gentleman with industrial accident to his right hand. He presented with a degloved long finger to the level of the PIP joint with absolutely no soft tissue. Imaging studies demonstrated an undetermined age ulnar styloid fracture but really nothing bony in the hand. Unfortunately with no soft tissue on the long finger is nonviable. We elected to proceed with a revision amputation of the right long finger. Patient agreed. Narrative  Patient was in the emergency department. We injected local anesthetic in a digital block format. Consent was obtained from patient and there was another member with him. Then prepped the hand a standard fashion. Additional local anesthetic was injected in the skin. Then used a sharp blade at the level of the PIP joint and remove that portion. The condyles needed to be removed as well to get enough soft tissue coverage. These were rongeured back. No significant bleeders were encountered. Wounds were again irrigated. Closed wound with nylon suture and dressings. Postoperative plan  Nonweightbearing on that side. As far as the ulnar styloid I am not sure that is actually acute. No treatment is necessary at this point.   Becomes more problematic could consider a cock-up wrist splint. We will see him in the office on Wednesday and evaluate his ulnar styloid as well as dressing change to that finger. No x-rays be necessary unless new symptoms present themselves.     Electronically signed by Evelin Pino MD on 7/9/2021 at 4:42 PM

## 2021-07-09 NOTE — ED NOTES
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 Pt states he was working on a conveyer belt when his right was caught in the belt. Pt pulled his hand out, which resulted in a degloving of his right middle finger. Prior to EMS arrival, pt hand was wrapped in gauze and the pt skin retrieved from the machine was placed in a glove and put on an ice pack. Swelling noted to patient hand just below thumb. Pt able to move all other fingers and sensation is present as well. Orthopedics perfect served by charge nurse Elbert Willis.       Celedonio Gosselin, RN  07/09/21 [id number]

## 2021-07-09 NOTE — ED NOTES
Dr. Jorge Covington and his orthopedic team at bedside performing an amputation of pt right middle finger at this time.      Bridget Velasquez RN  07/09/21 [id number]

## 2021-07-09 NOTE — ED PROVIDER NOTES
Take 1 tablet by mouth every 6 hours as needed for Pain 60 tablet 0       ALLERGIES    No Known Allergies    FAMILY HISTORY    Family History   Problem Relation Age of Onset    Heart Disease Father        SOCIAL HISTORY    Social History     Socioeconomic History    Marital status:      Spouse name: Not on file    Number of children: Not on file    Years of education: Not on file    Highest education level: Not on file   Occupational History    Not on file   Tobacco Use    Smoking status: Former Smoker     Packs/day: 1.00     Years: 22.00     Pack years: 22.00     Types: Cigarettes     Quit date: 10/25/2018     Years since quittin.7    Smokeless tobacco: Never Used    Tobacco comment: stated quit at the end of 2018   Vaping Use    Vaping Use: Never used   Substance and Sexual Activity    Alcohol use: No    Drug use: No    Sexual activity: Yes     Partners: Female   Other Topics Concern    Not on file   Social History Narrative    Not on file     Social Determinants of Health     Financial Resource Strain:     Difficulty of Paying Living Expenses:    Food Insecurity:     Worried About Running Out of Food in the Last Year:     [address] in the Last Year:    Transportation Needs:     Lack of Transportation (Medical):      Lack of Transportation (Non-Medical):    Physical Activity:     Days of Exercise per Week:     Minutes of Exercise per Session:    Stress:     Feeling of Stress :    Social Connections:     Frequency of Communication with Friends and Family:     Frequency of Social Gatherings with Friends and Family:     Attends [employer] Services:     Active Member of Clubs or Organizations:     Attends Club or Organization Meetings:     Marital Status:    Intimate Partner Violence:     Fear of Current or Ex-Partner:     Emotionally Abused:     Physically Abused:     Sexually Abused:        REVIEW OF SYSTEMS    Constitutional:  Denies fever, chills, weight loss or weakness   Eyes:  Denies photophobia or discharge   HENT:  Denies sore throat or ear pain   Respiratory:  Denies cough or shortness of breath   Cardiovascular:  Denies chest pain, palpitations or swelling   GI:  Denies abdominal pain, nausea, vomiting, or diarrhea   Musculoskeletal:  Denies back pain   Skin:  Denies rash   Neurologic:  Denies headache, focal weakness or sensory changes   Endocrine:  Denies polyuria or polydypsia   Lymphatic:  Denies swollen glands   Psychiatric:  Denies depression, suicidal ideation or homicidal ideation   All systems negative except as marked. PHYSICAL EXAM    VITAL SIGNS: BP (!) 147/91   Pulse 83   Resp 18   SpO2 96%    Constitutional:  Well developed, Well nourished, No acute distress, Non-toxic appearance. HENT:  Normocephalic, Atraumatic, Bilateral external ears normal, Oropharynx moist, No oral exudates, Nose normal. Neck- Normal range of motion, No tenderness, Supple, No stridor. Eyes:  PERRL, EOMI, Conjunctiva normal, No discharge. Respiratory:  Normal breath sounds, No respiratory distress, No wheezing, No chest tenderness. Cardiovascular:  Normal heart rate, Normal rhythm, No murmurs, No rubs, No gallops. GI:  Bowel sounds normal, Soft, No tenderness, No masses, No pulsatile masses. : External genitalia appear normal, No masses or lesions. No discharge. No CVA tenderness. Musculoskeletal:  Intact distal pulses, No edema, No tenderness, No cyanosis, No clubbing. Good range of motion in all major joints. No tenderness to palpation or major deformities noted. Back- No tenderness. Right hand degloving injury of the middle finger below her middle phalanx. Patient is able to move other fingers and has sensation in them. Integument:  Warm, Dry, No erythema, No rash. Lymphatic:  No lymphadenopathy noted. Neurologic:  Alert & oriented x 3, Normal motor function, Normal sensory function, No focal deficits noted.    Psychiatric:  Affect normal, Judgment normal, Mood normal.     EKG        RADIOLOGY    XR WRIST RIGHT (MIN 3 VIEWS)   Final Result   1. Nondisplaced fracture base of the ulnar styloid   2. No additional fractures identified difficult to exclude a fracture involving the third digit as detailed above            **This report has been created using voice recognition software. It may contain minor errors which are inherent in voice recognition technology. **      Final report electronically signed by Dr. Chavo Mariano on 7/9/2021 4:16 PM      XR HAND RIGHT (MIN 3 VIEWS)   Final Result   1. Nondisplaced fracture base of the ulnar styloid   2. No additional fractures identified difficult to exclude a fracture involving the third digit as detailed above            **This report has been created using voice recognition software. It may contain minor errors which are inherent in voice recognition technology. **      Final report electronically signed by Dr. Chavo Mariano on 7/9/2021 4:16 PM          PROCEDURES        ED COURSE & MEDICAL DECISION MAKING    Pertinent Labs & Imaging studies reviewed. (See chart for details)  Patient was evaluated by orthopedic surgery in the emergency department and amputation of his middle finger was done. Patient was given cefazolin and also tetanus toxoid. Patient will be discharged home on antibiotics and pain medications. Patient is advised to follow-up with orthopedic surgery in 1 to 2 days. He is also advised to come back to the emergency department if there is any increased pain, discharge from the wound or fever or chills. FINAL IMPRESSION    1. Degloving injury of finger, initial encounter    2.  Amputation finger, initial encounter            Dunia Arana MD  07/09/21 0207

## 2021-07-09 NOTE — H&P
History and Physical        CHIEF COMPLAINT:  Right Hand crush injury with degloving 3rd finger    HISTORY OF PRESENT ILLNESS:      The patient is a 48 y.o. male  who presents with acute injury to his right hand sustained this afternoon while at work. Patient states his hand got pulled into a conveyor belt/roller and he tried to pull his hand out as quickly as possible when it happened. He noted that the 3rd finger had the skin/soft tissue degloved from the finger and it was put on ice for transport. He also noted pain in the other fingers, remainder of the hand, and wrist secondary to the hand going through this crush type injury. His work environment is rather dirty. He was brought to [facility] ED for further evaluation. He is endorsing sharp pains in the right 3rd finger as well as constant aching pains in the remaining fingers as well as his hand and wrist. He has altered sensation to the 3rd digit but has good sensation to the remaining fingers. We were asked to evaluate in the ED. Past Medical History:    Past Medical History:   Diagnosis Date    Arthritis     Atrial fibrillation ([address]     CAD (coronary artery disease)     Chest pain     Heart palpitations     Hypertension     Nausea & vomiting     PONV (postoperative nausea and vomiting)     Sleep apnea     wears cpap       Past Surgical History:    Past Surgical History:   Procedure Laterality Date    CARDIAC SURGERY  1/14    ABLATION AT [address] FOOT SURGERY Right 2012    ME LAP,CHOLECYSTECTOMY/GRAPH N/A 10/15/2018    LAP CHOLECYSTECTOMY performed by Kira Manley MD at [facility]       Medications Prior to Admission:   Prior to Admission medications    Medication Sig Start Date End Date Taking?  Authorizing Provider   metoprolol succinate (TOPROL XL) 100 MG extended release tablet Take 1 tablet by mouth daily 6/14/21   MAHNAZ Rivera - CNP   aspirin EC 81 MG EC tablet Take 81 mg by mouth daily    Historical Provider, MD   dilTIAZem (DILACOR XR) 180 MG extended release capsule Take 1 capsule by mouth 2 times daily 12/3/20   Yudith Shrestha MD   nitroGLYCERIN (NITROSTAT) 0.4 MG SL tablet Place 1 tablet under the tongue every 5 minutes as needed for Chest pain 12/3/20   Yudith Shrestha MD   hydroCHLOROthiazide (HYDRODIURIL) 25 MG tablet Take 25 mg by mouth daily    Historical Provider, MD   ibuprofen (ADVIL;MOTRIN) 800 MG tablet Take 1 tablet by mouth every 6 hours as needed for Pain 2/10/20   MAHNAZ Garland CNP    Scheduled Meds:   ceFAZolin  2,000 mg Intravenous Once    tetanus & diphtheria toxoids (adult)  0.5 mL Intramuscular Once    bupivacaine (PF)         Continuous Infusions:  PRN Meds:. Allergies:  Patient has no known allergies. Social History:   Social History     Socioeconomic History    Marital status:      Spouse name: Not on file    Number of children: Not on file    Years of education: Not on file    Highest education level: Not on file   Occupational History    Not on file   Tobacco Use    Smoking status: Former Smoker     Packs/day: 1.00     Years: 22.00     Pack years: 22.00     Types: Cigarettes     Quit date: 10/25/2018     Years since quittin.7    Smokeless tobacco: Never Used    Tobacco comment: stated quit at the end of 2018   Vaping Use    Vaping Use: Never used   Substance and Sexual Activity    Alcohol use: No    Drug use: No    Sexual activity: Yes     Partners: Female   Other Topics Concern    Not on file   Social History Narrative    Not on file     Social Determinants of Health     Financial Resource Strain:     Difficulty of Paying Living Expenses:    Food Insecurity:     Worried About Running Out of Food in the Last Year:     [address] in the Last Year:    Transportation Needs:     Lack of Transportation (Medical):      Lack of Transportation (Non-Medical):    Physical Activity:     Days of Exercise per Week:     Minutes of Exercise per Session: Stress:     Feeling of Stress :    Social Connections:     Frequency of Communication with Friends and Family:     Frequency of Social Gatherings with Friends and Family:     Attends Scientologist Services:     Active Member of Clubs or Organizations:     Attends Club or Organization Meetings:     Marital Status:    Intimate Partner Violence:     Fear of Current or Ex-Partner:     Emotionally Abused:     Physically Abused:     Sexually Abused:      Social History     Tobacco Use   Smoking Status Former Smoker    Packs/day: 1.00    Years: 22.00    Pack years: 22.00    Types: Cigarettes    Quit date: 10/25/2018    Years since quittin.7   Smokeless Tobacco Never Used   Tobacco Comment    stated quit at the end of 2018     Social History     Substance and Sexual Activity   Alcohol Use No     Social History     Substance and Sexual Activity   Drug Use No       Family History:  Family History   Problem Relation Age of Onset    Heart Disease Father          REVIEW OF SYSTEMS:  Gen: Negative for nausea, vomiting, diarrhea, fever, chills, night sweats  Heart: Negative for HTN, palpitations, chest pain  Lungs: Negative for wheezes, asthma or SOB  GI: Negative for nausea, vomiting  Endo: Negative for diabetes  Heme: Negative for DVT       PHYSICAL EXAM:  Patient Vitals for the past 24 hrs:   BP Pulse Resp SpO2   21 1536 (!) 147/91 83 18 96 %     Gen: alert and oriented  Head: normorcephalic, atraumatic  Neck: supple  Heart: RRR  Lungs: No audible wheezes  Abdomen: soft  RUE:  Soft tissue degloving injury noted right 3rd digit starting around the PIP joint. Slight bleeding noted from the wound. Remaining fingers shows skin in good repair with no obvious deformities. Sensation intact on ulnar, radial, volar, and dorsal aspects of all digits. Stiffness noted with difficulty moving digits secondary to swelling and pain. Capillary refill intact. Radial and ulnar pulses intact.     DATA:  CBC:   Lab Results   Component Value Date    WBC 6.4 01/06/2020    WBC 14.0 10/02/2018    HGB 16.4 01/06/2020     01/06/2020     10/02/2018     BMP:    Lab Results   Component Value Date     01/06/2020    K 4.4 01/06/2020     01/06/2020    CO2 27 01/06/2020    BUN 16 01/06/2020    CREATININE 1.09 01/06/2020    CALCIUM 9.5 01/06/2020    GLUCOSE 113 01/06/2020     PT/INR:    Lab Results   Component Value Date    INR 0.96 09/10/2018     Troponin:    Lab Results   Component Value Date    TROPONINI <0.006 01/09/2013         ASSESSMENT: Right 3rd Finger Degloving injury    PLAN:  As discussed with Dr Shakira Galdamez, ortho attending surgeon, plan is to proceed with a bedside revision amputation of the right 3rd finger degloving injury secondary to the extensive soft tissue damage and vascular compromise associated with these types of injuries. Patient to consent to the procedure, separate procedural note to follow by Dr Shakira Galdamez. Patient tolerated procedure and to be discharged on antibiotics and pain medication. He will follow-up in office on 7/14/2021. Patient was in agreement with this plan. This plan was discussed in full detail with Dr Shakira Galdamez and he agrees with it.         ALEJA Aguilera

## 2021-08-09 ENCOUNTER — OFFICE VISIT (OUTPATIENT)
Dept: CARDIOLOGY CLINIC | Age: 46
End: 2021-08-09
Payer: COMMERCIAL

## 2021-08-09 VITALS
SYSTOLIC BLOOD PRESSURE: 146 MMHG | WEIGHT: 315 LBS | HEIGHT: 73 IN | DIASTOLIC BLOOD PRESSURE: 90 MMHG | HEART RATE: 71 BPM | BODY MASS INDEX: 41.75 KG/M2

## 2021-08-09 DIAGNOSIS — I48.91 ATRIAL FIBRILLATION, UNSPECIFIED TYPE (HCC): Primary | ICD-10-CM

## 2021-08-09 PROCEDURE — 99212 OFFICE O/P EST SF 10 MIN: CPT | Performed by: INTERNAL MEDICINE

## 2021-08-09 RX ORDER — DILTIAZEM HYDROCHLORIDE 180 MG/1
180 CAPSULE, EXTENDED RELEASE ORAL 2 TIMES DAILY
Qty: 60 CAPSULE | Refills: 0 | Status: SHIPPED | OUTPATIENT
Start: 2021-08-09

## 2021-08-09 NOTE — PROGRESS NOTES
[facility] [place] [address] [place] [address]  Dept: [phone]  Dept Fax: [phone]  Loc: [phone]    Visit Date: 8/9/2021    Mr. Jose Yin is a 48 y.o. male  who presented for:  Chief Complaint   Patient presents with    6 Month Follow-Up       HPI:   HPI     Patient is a 58-year-old male history of A. fib status post ablation at [address] [place] [address].  6-month follow-up for his chest pressure and chest pain. Patient has not had any further episodes of chest pain, palpitation, shortness of breath, nausea or vomiting. No syncope. Preserved EF. Patient recently presented to the emergency room last month for right hand trapped in a conveyor belt. He had a degloving injury to his right middle finger. Patient had a stress test in 12/2020 which was normal.        Current Outpatient Medications:     metoprolol succinate (TOPROL XL) 100 MG extended release tablet, Take 1 tablet by mouth daily, Disp: 90 tablet, Rfl: 0    aspirin EC 81 MG EC tablet, Take 81 mg by mouth daily, Disp: , Rfl:     dilTIAZem (DILACOR XR) 180 MG extended release capsule, Take 1 capsule by mouth 2 times daily, Disp: 180 capsule, Rfl: 3    nitroGLYCERIN (NITROSTAT) 0.4 MG SL tablet, Place 1 tablet under the tongue every 5 minutes as needed for Chest pain, Disp: 25 tablet, Rfl: 3    hydroCHLOROthiazide (HYDRODIURIL) 25 MG tablet, Take 25 mg by mouth daily, Disp: , Rfl:     ibuprofen (ADVIL;MOTRIN) 800 MG tablet, Take 1 tablet by mouth every 6 hours as needed for Pain, Disp: 60 tablet, Rfl: 0    Past Medical History  Jorge  has a past medical history of Arthritis, Atrial fibrillation ([facility] [address] CAD (coronary artery disease), Chest pain, Heart palpitations, Hypertension, Nausea & vomiting, PONV (postoperative nausea and vomiting), and Sleep apnea. Social History  Jorge  reports that he quit smoking about 2 years ago. His smoking use included cigarettes.  He has a 22.00 pack-year smoking history. He has never used smokeless tobacco. He reports that he does not drink alcohol and does not use drugs. Family History  Jorge family history includes Heart Disease in his father. There is no family history of bicuspid aortic valve, aneurysms, heart transplant, pacemakers, defibrillators, or sudden cardiac death. Past Surgical History   Past Surgical History:   Procedure Laterality Date    CARDIAC SURGERY  1/14    [place] [address] [facility] FOOT SURGERY Right 2012    DE LAP,CHOLECYSTECTOMY/GRAPH N/A 10/15/2018    LAP CHOLECYSTECTOMY performed by Emanuel Smiley MD at [address]   Constitutional: Negative for chills and fever  HENT: Negative for congestion, sinus pressure, sneezing and sore throat. Eyes: Negative for pain, discharge, redness and itching. Respiratory: Negative for apnea, cough  Gastrointestinal: Negative for blood in stool, constipation, diarrhea   Endocrine: Negative for cold intolerance, heat intolerance, polydipsia. Genitourinary: Negative for dysuria, enuresis, flank pain and hematuria. Musculoskeletal: Negative for arthralgias, joint swelling and neck pain. Neurological: Negative for numbness and headaches. Psychiatric/Behavioral: Negative for agitation, confusion, decreased concentration and dysphoric mood. Objective:     BP (!) 150/90   Pulse 71   Ht 6' 1\" (1.854 m)   Wt (!) 339 lb 12.8 oz (154.1 kg)   BMI 44.83 kg/m²     Wt Readings from Last 3 Encounters:   08/09/21 (!) 339 lb 12.8 oz (154.1 kg)   12/03/20 (!) 324 lb 3.2 oz (147.1 kg)   06/23/20 (!) 322 lb 12.8 oz (146.4 kg)     BP Readings from Last 3 Encounters:   08/09/21 (!) 150/90   07/09/21 (!) 147/91   12/03/20 138/82       Nursing note and vitals reviewed. Physical Exam   Constitutional: Oriented to person, place, and time. Appears well-developed and well-nourished. HENT:   Head: Normocephalic and atraumatic.    Eyes: EOM are normal. Pupils are equal, round, and reactive to light. Neck: Normal range of motion. Neck supple. No JVD present. Cardiovascular: Normal rate, regular rhythm, normal heart sounds and intact distal pulses. No murmur heard. Pulmonary/Chest: Effort normal and breath sounds normal. No respiratory distress. No wheezes. No rales. Abdominal: Soft. Bowel sounds are normal. No distension. There is no tenderness. Musculoskeletal: Normal range of motion. No edema. 2nd finger degloving injury. Neurological: Alert and oriented to person, place, and time. No cranial nerve deficit. Coordination normal.   Skin: Skin is warm and dry. Psychiatric: Normal mood and affect.        No results found for: CKTOTAL, CKMB, CKMBINDEX    Lab Results   Component Value Date    WBC 6.4 01/06/2020    WBC 14.0 10/02/2018    RBC 5.13 01/06/2020    HGB 16.4 01/06/2020    HCT 49.0 01/06/2020    MCV 96 01/06/2020    MCH 32.0 01/06/2020    MCHC 33.5 01/06/2020    RDW 14.2 01/06/2020     01/06/2020     10/02/2018    MPV 8.6 01/06/2020       Lab Results   Component Value Date     01/06/2020    K 4.4 01/06/2020     01/06/2020    CO2 27 01/06/2020    BUN 16 01/06/2020    LABALBU 4.2 12/10/2020    LABALBU 4.3 05/31/2012    CREATININE 1.09 01/06/2020    CALCIUM 9.5 01/06/2020    LABGLOM 41 10/02/2018    GLUCOSE 113 01/06/2020       Lab Results   Component Value Date    ALKPHOS 76 12/10/2020    ALKPHOS 67 10/02/2018    ALT 54 12/10/2020    AST 36 12/10/2020    PROT 6.9 12/10/2020    PROT 6.9 09/20/2013    BILITOT 0.5 12/10/2020    BILIDIR 0.1 12/10/2020    LABALBU 4.2 12/10/2020    LABALBU 4.3 05/31/2012       No results found for: MG    Lab Results   Component Value Date    INR 0.96 09/10/2018         Lab Results   Component Value Date    LABA1C 6.2 09/11/2020       Lab Results   Component Value Date    TRIG 160 01/06/2020    HDL 40 01/06/2020    LDLCALC 99 01/06/2020    LABVLDL 32 01/06/2020       Lab Results   Component Value Date    TSH 1.56 2020         Testing Reviewed:      I have individually reviewed the cardiac test below:    ECHO: Results for orders placed in visit on 11    ECHO Complete 2D W Doppler W Color    Narrative  [facility]  [address], [address]  Phone: [phone]  Fax: [phone]    Transthoracic Echocardiogram  2D, M-mode, Doppler, and Color Doppler    Name: Antoine Duane  : [id number]  MR #: [id number]  Study date: 2011  Account #: [de-identified]  Age: 38 years  Gender: Male  Ht-Wt-BSA: 72.8 in- 277.2 lb- 2.47 mÂ²    Reading Physician:  Tomasa Reilly DO  Technologist:  Art Lloyd, RRT, RDCS  Referring Physician:  Tomasa Reilly DO    Reason for study: Chest pain, shortness of breath and palpitations, AVR with rapid ventricular response. PROCEDURE: The procedure was performed at the bedside. The procedure was performed as an inpatient. This was a routine  study. The transthoracic approach was used. The study included complete 2D imaging, M-mode, complete spectral Doppler, and color Doppler. Systolic blood pressure was 105 mmHg, at the start of the study. Diastolic blood pressure was 67 mmHg, at the start of  the study. Image quality was adequate. LEFT VENTRICLE: The ventricle was mildly dilated. Systolic function was normal. Ejection fraction was estimated in the  range of 55 % to 65 %. There were no regional wall motion abnormalities. Wall thickness was normal. DOPPLER: Doppler  parameters were consistent with abnormal left ventricular relaxation (grade 1 diastolic dysfunction). AORTIC VALVE: The valve was trileaflet. Leaflets exhibited normal thickness and normal cuspal separation. DOPPLER:  Transaortic velocity was within the normal range. There was no evidence for stenosis. There was no regurgitation. AORTA: The root exhibited normal size. MITRAL VALVE: Valve structure was normal. There was normal leaflet separation.  DOPPLER: The transmitral velocity was  within the normal range. There was no evidence for stenosis. There was trivial regurgitation. LEFT ATRIUM: Size was normal.    RIGHT VENTRICLE: The size was normal. Systolic function was normal. Wall thickness was normal.    PULMONIC VALVE: Leaflets exhibited normal thickness, no calcification, and normal cuspal separation. DOPPLER: The  transpulmonic velocity was within the normal range. There was no regurgitation. PULMONARY ARTERY: The size was normal. DOPPLER: Systolic pressure was within the normal range. TRICUSPID VALVE: The valve structure was normal. There was normal leaflet separation. DOPPLER: The transtricuspid  velocity was within the normal range. There was no evidence for stenosis. There was mild regurgitation. RIGHT ATRIUM: Size was normal.    SYSTEMIC VEINS: IVC: The inferior vena cava was normal in size. PERICARDIUM: There was no pericardial effusion. The pericardium was normal in appearance. SYSTEM MEASUREMENT TABLES    2D mode  LA Dimension (2D): 2.8 cm  FS (2D-Cubed): 25 %  FS (2D-Teich): 25 %  IVSd (2D): 1 cm  IVSd; Mean chosen (2D): 1 cm  LVIDd (2D): 5.1 cm  LVIDs (2D): 3.8 cm  LVOT Area (2D): 3.1 cm2  LVPWd (2D): 0.9 cm  RVIDd (2D): 3.8 cm    M mode  AoR Diam (MM): 3 cm  AV Cusp Sep (MM): 2.2 cm  IVSd (MM): 1 cm  LVIDd (MM): 4.4 cm  LVIDs (MM): 3.3 cm  LVPWd (MM): 0.9 cm  MV D-E Exc: 1.9 cm  MV EF Parke (MM): 7.9 cm/s  RVIDd (MM): 3.4 cm    Unspecified Scan Mode  CV Orifice Area; Cont Eq by VTI: 2 cm2  VTI; Antegrade Flow: 21.9 cm  Vmax; Antegrade Flow: 1 m/s  LVOT Diam: 2 cm  LVOT Vmax: 68.7 cm/s  MV Peak A Harshil; Mean: 89.5 cm/s  MV Peak E Harshil; Antegrade Flow: 100 cm/s  Vmax; Antegrade Flow: 35.9 cm/s  Vmax; Mean; Antegrade Flow: 35.9 cm/s  TV Peak A Harshil: 35.5 cm/s  TV Peak E Harshil; Antegrade Flow: 61.7 cm/s    SUMMARY:    Left ventricle: The ventricle was mildly dilated. Systolic function was normal. Ejection fraction was estimated in the range of 55 % to 65 %.   There were no regional wall motion abnormalities. Doppler parameters were consistent with abnormal left ventricular relaxation (grade 1 diastolic dysfunction). Tricuspid valve: There was mild regurgitation. Prepared and signed by    Palma Newman DO  Signed 26-Jan-2011 11:14:39       Assessment/Plan   Chest pain resolved; Lexiscan stress test normal 12/2020  Atrial fibrillation status post ablation at [facility] Vasc of 0-1. No recurrence. Continue ASA. Weight gain noted with patient discussed exercise/weight loss/healthy lifestyles. Patient educated and will try to follow through    Disposition:  As needed    Case Discussed with Dr. Alberto Venegas DO  Internal Medicine Resident PGY-2 2-year       Electronically signed by Elisabet Serra DO   8/9/2021 at 8:39 AM EDT    Attending Supervising Physician's Attestation Statement  I performed a history and physical examination on the patient and discussed the management with the resident physician. I reviewed and agree with the findings and plan as documented in the resident's note.     Electronically signed by Sasha Anderson MD on 8/9/21 at 8:51 AM EDT

## 2022-04-07 ENCOUNTER — HOSPITAL ENCOUNTER (OUTPATIENT)
Age: 47
Discharge: HOME | End: 2022-04-07
Payer: COMMERCIAL

## 2022-04-07 ENCOUNTER — HOSPITAL ENCOUNTER (OUTPATIENT)
Dept: GENERAL RADIOLOGY | Age: 47
Discharge: HOME | End: 2022-04-07
Payer: COMMERCIAL

## 2022-04-07 DIAGNOSIS — R52 PAIN: ICD-10-CM

## 2022-04-07 PROCEDURE — 73030 X-RAY EXAM OF SHOULDER: CPT

## 2024-01-30 ENCOUNTER — OFFICE VISIT (OUTPATIENT)
Dept: CARDIOLOGY CLINIC | Age: 49
End: 2024-01-30
Payer: COMMERCIAL

## 2024-01-30 ENCOUNTER — TELEPHONE (OUTPATIENT)
Dept: CARDIOLOGY CLINIC | Age: 49
End: 2024-01-30

## 2024-01-30 VITALS
DIASTOLIC BLOOD PRESSURE: 84 MMHG | SYSTOLIC BLOOD PRESSURE: 150 MMHG | HEART RATE: 64 BPM | HEIGHT: 73 IN | BODY MASS INDEX: 41.75 KG/M2 | WEIGHT: 315 LBS

## 2024-01-30 DIAGNOSIS — R07.9 CHEST PAIN AT REST: Primary | ICD-10-CM

## 2024-01-30 DIAGNOSIS — R94.31 ABNORMAL EKG: ICD-10-CM

## 2024-01-30 PROCEDURE — 99204 OFFICE O/P NEW MOD 45 MIN: CPT | Performed by: INTERNAL MEDICINE

## 2024-01-30 RX ORDER — LISINOPRIL 40 MG/1
40 TABLET ORAL DAILY
COMMUNITY
Start: 2024-01-13

## 2024-01-30 RX ORDER — SEMAGLUTIDE 0.68 MG/ML
INJECTION, SOLUTION SUBCUTANEOUS
COMMUNITY
Start: 2024-01-19

## 2024-01-30 RX ORDER — ATORVASTATIN CALCIUM 10 MG/1
10 TABLET, FILM COATED ORAL DAILY
COMMUNITY
Start: 2024-01-13

## 2024-01-30 RX ORDER — GABAPENTIN 600 MG/1
600 TABLET ORAL 3 TIMES DAILY
COMMUNITY
Start: 2023-11-07

## 2024-01-30 NOTE — TELEPHONE ENCOUNTER
PROCEDURE: cardiac cath prior auth     DATE OF SERVICE: 02/06/2024    SERVICE LOCATION: [facility]     CPT CODE: 36329    PHYSICIAN: DR ODONNELL     DATE PRIOR AUTH SUBMITTED: 01/30/2024    STATUS: APPROVED.     AUTH NUMBER: [id number]    VALID:  01/30/2024-07/28/2024

## 2024-01-30 NOTE — PROGRESS NOTES
[facility] [place] [facility]  [facility] CARDIOLOGY  [address]  [address]  [facility] [address]  Dept: [phone]  Dept Fax: [phone]  Loc: [phone]    Visit Date: 1/30/2024    Mr. Lama is a 48 y.o. male  who presented for:  Chief Complaint   Patient presents with    Follow-up     Here for unstable angina, EKG completed on 01/19/2024 with PCP    Chest Pain    Shortness of Breath    Palpitations    Fatigue       HPI:   HPI   47 yo M presents with chest pain.  Has hx of HTN.  Has had 1 month of symptoms.  He has had it a couple times in the last year, but now more frequent.  Dull ache, left side substernal, 1 minute.  Happens when he is sitting there.  Takes NTG SL but has not helped.  Not ED visits, no admission.  No cath or PCI.  No MI, CVA, CKD.  He has DM II.  BP 150s.  No a/t/d.  Never woke up from sleep.  He sometimes feels strong heart beats at night.  No arm or jaw pain. 2020 stress test negative.  Not associated with food.  No abd pain.  He is taking all meds.        Current Outpatient Medications:     lisinopril (PRINIVIL;ZESTRIL) 40 MG tablet, Take 1 tablet by mouth daily, Disp: , Rfl:     atorvastatin (LIPITOR) 10 MG tablet, Take 1 tablet by mouth daily, Disp: , Rfl:     gabapentin (NEURONTIN) 600 MG tablet, Take 1 tablet by mouth 3 times daily., Disp: , Rfl:     OZEMPIC, 0.25 OR 0.5 MG/DOSE, 2 MG/3ML SOPN, INJECT 0.25 MG SUBCUTANEOUSLY ONE TIME PER WEEK ON THE SAME DAY OF EACH WEEK, Disp: , Rfl:     dilTIAZem (DILACOR XR) 180 MG extended release capsule, Take 1 capsule by mouth 2 times daily, Disp: 60 capsule, Rfl: 0    metoprolol succinate (TOPROL XL) 100 MG extended release tablet, Take 1 tablet by mouth daily, Disp: 90 tablet, Rfl: 0    aspirin EC 81 MG EC tablet, Take 1 tablet by mouth daily, Disp: , Rfl:     nitroGLYCERIN (NITROSTAT) 0.4 MG SL tablet, Place 1 tablet under the tongue every 5 minutes as needed for Chest pain, Disp: 25 tablet, Rfl: 3

## 2024-02-05 ENCOUNTER — PREP FOR PROCEDURE (OUTPATIENT)
Dept: CARDIOLOGY | Age: 49
End: 2024-02-05

## 2024-02-05 RX ORDER — SODIUM CHLORIDE 9 MG/ML
INJECTION, SOLUTION INTRAVENOUS CONTINUOUS
Status: CANCELLED | OUTPATIENT
Start: 2024-02-05

## 2024-02-05 RX ORDER — SODIUM CHLORIDE 0.9 % (FLUSH) 0.9 %
5-40 SYRINGE (ML) INJECTION EVERY 12 HOURS SCHEDULED
Status: CANCELLED | OUTPATIENT
Start: 2024-02-05

## 2024-02-05 RX ORDER — SODIUM CHLORIDE 0.9 % (FLUSH) 0.9 %
5-40 SYRINGE (ML) INJECTION PRN
Status: CANCELLED | OUTPATIENT
Start: 2024-02-05

## 2024-02-05 RX ORDER — SODIUM CHLORIDE 9 MG/ML
INJECTION, SOLUTION INTRAVENOUS PRN
Status: CANCELLED | OUTPATIENT
Start: 2024-02-05

## 2024-02-05 RX ORDER — NITROGLYCERIN 0.4 MG/1
0.4 TABLET SUBLINGUAL EVERY 5 MIN PRN
Status: CANCELLED | OUTPATIENT
Start: 2024-02-05

## 2024-02-05 RX ORDER — ASPIRIN 325 MG
325 TABLET ORAL ONCE
Status: CANCELLED | OUTPATIENT
Start: 2024-02-05 | End: 2024-02-05

## 2024-02-05 NOTE — PRE-PROCEDURE INSTRUCTIONS
No answer, left message to be Notified of Heart Cath procedure arrival time 1000 on Tuesday  [place] on 2nd floor of [facility]  NPO after midnight  Bring drivers license and insurance information  Wear comfortable clean clothes  Shower morning of and night before with liquid antibacterial soap  Remove jewelry   May have to stay overnight if have PTCA/stent - bring small overnight bag  Bring medications in original bottles - may take am meds with small amount of water.    Do not take any diabetic meds day of procedure.  Made aware of visitors limit to 2 at a time  Follow all instructions given by your physician  Please notify doctor office if you need to cancel or reschedule your procedure   needed at discharge  If you use CPap or BiPap for sleep apnea, please bring machine with you  Leave valuables at home

## 2024-02-06 ENCOUNTER — HOSPITAL ENCOUNTER (OUTPATIENT)
Age: 49
Setting detail: OUTPATIENT SURGERY
Discharge: HOME | End: 2024-02-06
Attending: INTERNAL MEDICINE | Admitting: INTERNAL MEDICINE
Payer: COMMERCIAL

## 2024-02-06 VITALS
BODY MASS INDEX: 41.75 KG/M2 | OXYGEN SATURATION: 94 % | SYSTOLIC BLOOD PRESSURE: 132 MMHG | WEIGHT: 315 LBS | DIASTOLIC BLOOD PRESSURE: 75 MMHG | HEIGHT: 73 IN | TEMPERATURE: 98.7 F | HEART RATE: 73 BPM | RESPIRATION RATE: 18 BRPM

## 2024-02-06 DIAGNOSIS — R07.89 ATYPICAL CHEST PAIN: Primary | ICD-10-CM

## 2024-02-06 DIAGNOSIS — R94.31 ABNORMAL EKG: ICD-10-CM

## 2024-02-06 LAB
ABO: NORMAL
ANION GAP SERPL CALC-SCNC: 13 MEQ/L (ref 8–16)
ANTIBODY SCREEN: NORMAL
APTT PPP: 34.9 SECONDS (ref 22–38)
BUN SERPL-MCNC: 17 MG/DL (ref 7–22)
CALCIUM SERPL-MCNC: 9.1 MG/DL (ref 8.5–10.5)
CHLORIDE SERPL-SCNC: 105 MEQ/L (ref 98–111)
CHOLEST SERPL-MCNC: 111 MG/DL (ref 100–199)
CO2 SERPL-SCNC: 24 MEQ/L (ref 23–33)
CREAT SERPL-MCNC: 1 MG/DL (ref 0.4–1.2)
DEPRECATED RDW RBC AUTO: 45.2 FL (ref 35–45)
ECHO BSA: 2.76 M2
EKG ATRIAL RATE: 66 BPM
EKG P AXIS: 54 DEGREES
EKG P-R INTERVAL: 184 MS
EKG Q-T INTERVAL: 378 MS
EKG QRS DURATION: 88 MS
EKG QTC CALCULATION (BAZETT): 396 MS
EKG R AXIS: 42 DEGREES
EKG T AXIS: 38 DEGREES
EKG VENTRICULAR RATE: 66 BPM
ERYTHROCYTE [DISTWIDTH] IN BLOOD BY AUTOMATED COUNT: 12.8 % (ref 11.5–14.5)
GFR SERPL CREATININE-BSD FRML MDRD: > 60 ML/MIN/1.73M2
GLUCOSE SERPL-MCNC: 98 MG/DL (ref 70–108)
HCT VFR BLD AUTO: 53.3 % (ref 42–52)
HDLC SERPL-MCNC: 34 MG/DL
HGB BLD-MCNC: 17.5 GM/DL (ref 14–18)
INR PPP: 0.98 (ref 0.85–1.13)
LDLC SERPL CALC-MCNC: 54 MG/DL
MCH RBC QN AUTO: 31.4 PG (ref 26–33)
MCHC RBC AUTO-ENTMCNC: 32.8 GM/DL (ref 32.2–35.5)
MCV RBC AUTO: 95.5 FL (ref 80–94)
PLATELET # BLD AUTO: 240 THOU/MM3 (ref 130–400)
PMV BLD AUTO: 10.2 FL (ref 9.4–12.4)
POTASSIUM SERPL-SCNC: 4.5 MEQ/L (ref 3.5–5.2)
RBC # BLD AUTO: 5.58 MILL/MM3 (ref 4.7–6.1)
RH FACTOR: NORMAL
SODIUM SERPL-SCNC: 142 MEQ/L (ref 135–145)
TRIGL SERPL-MCNC: 114 MG/DL (ref 0–199)
WBC # BLD AUTO: 5.5 THOU/MM3 (ref 4.8–10.8)

## 2024-02-06 PROCEDURE — 93010 ELECTROCARDIOGRAM REPORT: CPT | Performed by: INTERNAL MEDICINE

## 2024-02-06 PROCEDURE — C1769 GUIDE WIRE: HCPCS | Performed by: INTERNAL MEDICINE

## 2024-02-06 PROCEDURE — 85730 THROMBOPLASTIN TIME PARTIAL: CPT

## 2024-02-06 PROCEDURE — 2580000003 HC RX 258: Performed by: PHYSICIAN ASSISTANT

## 2024-02-06 PROCEDURE — 2500000003 HC RX 250 WO HCPCS: Performed by: INTERNAL MEDICINE

## 2024-02-06 PROCEDURE — 93458 L HRT ARTERY/VENTRICLE ANGIO: CPT | Performed by: INTERNAL MEDICINE

## 2024-02-06 PROCEDURE — 86901 BLOOD TYPING SEROLOGIC RH(D): CPT

## 2024-02-06 PROCEDURE — 6360000002 HC RX W HCPCS: Performed by: INTERNAL MEDICINE

## 2024-02-06 PROCEDURE — 2709999900 HC NON-CHARGEABLE SUPPLY: Performed by: INTERNAL MEDICINE

## 2024-02-06 PROCEDURE — C1894 INTRO/SHEATH, NON-LASER: HCPCS | Performed by: INTERNAL MEDICINE

## 2024-02-06 PROCEDURE — 80048 BASIC METABOLIC PNL TOTAL CA: CPT

## 2024-02-06 PROCEDURE — 93005 ELECTROCARDIOGRAM TRACING: CPT | Performed by: PHYSICIAN ASSISTANT

## 2024-02-06 PROCEDURE — 36415 COLL VENOUS BLD VENIPUNCTURE: CPT

## 2024-02-06 PROCEDURE — 80061 LIPID PANEL: CPT

## 2024-02-06 PROCEDURE — 86900 BLOOD TYPING SEROLOGIC ABO: CPT

## 2024-02-06 PROCEDURE — 85610 PROTHROMBIN TIME: CPT

## 2024-02-06 PROCEDURE — 7100000010 HC PHASE II RECOVERY - FIRST 15 MIN: Performed by: INTERNAL MEDICINE

## 2024-02-06 PROCEDURE — 7100000011 HC PHASE II RECOVERY - ADDTL 15 MIN: Performed by: INTERNAL MEDICINE

## 2024-02-06 PROCEDURE — 86850 RBC ANTIBODY SCREEN: CPT

## 2024-02-06 PROCEDURE — 85027 COMPLETE CBC AUTOMATED: CPT

## 2024-02-06 PROCEDURE — 6360000004 HC RX CONTRAST MEDICATION: Performed by: INTERNAL MEDICINE

## 2024-02-06 RX ORDER — AMOXICILLIN 500 MG
1 CAPSULE ORAL 2 TIMES DAILY
COMMUNITY

## 2024-02-06 RX ORDER — SODIUM CHLORIDE 0.9 % (FLUSH) 0.9 %
5-40 SYRINGE (ML) INJECTION EVERY 12 HOURS SCHEDULED
Status: DISCONTINUED | OUTPATIENT
Start: 2024-02-06 | End: 2024-02-06 | Stop reason: HOSPADM

## 2024-02-06 RX ORDER — MIDAZOLAM HYDROCHLORIDE 1 MG/ML
INJECTION INTRAMUSCULAR; INTRAVENOUS PRN
Status: DISCONTINUED | OUTPATIENT
Start: 2024-02-06 | End: 2024-02-06 | Stop reason: HOSPADM

## 2024-02-06 RX ORDER — SODIUM CHLORIDE 0.9 % (FLUSH) 0.9 %
5-40 SYRINGE (ML) INJECTION PRN
Status: DISCONTINUED | OUTPATIENT
Start: 2024-02-06 | End: 2024-02-06 | Stop reason: HOSPADM

## 2024-02-06 RX ORDER — NITROGLYCERIN 0.4 MG/1
0.4 TABLET SUBLINGUAL EVERY 5 MIN PRN
Status: DISCONTINUED | OUTPATIENT
Start: 2024-02-06 | End: 2024-02-06 | Stop reason: HOSPADM

## 2024-02-06 RX ORDER — ASPIRIN 325 MG
325 TABLET ORAL ONCE
Status: DISCONTINUED | OUTPATIENT
Start: 2024-02-06 | End: 2024-02-06 | Stop reason: HOSPADM

## 2024-02-06 RX ORDER — SODIUM CHLORIDE 9 MG/ML
INJECTION, SOLUTION INTRAVENOUS CONTINUOUS
Status: DISCONTINUED | OUTPATIENT
Start: 2024-02-06 | End: 2024-02-06 | Stop reason: HOSPADM

## 2024-02-06 RX ORDER — SODIUM CHLORIDE 9 MG/ML
INJECTION, SOLUTION INTRAVENOUS PRN
Status: DISCONTINUED | OUTPATIENT
Start: 2024-02-06 | End: 2024-02-06 | Stop reason: HOSPADM

## 2024-02-06 RX ORDER — FENTANYL CITRATE 50 UG/ML
INJECTION, SOLUTION INTRAMUSCULAR; INTRAVENOUS PRN
Status: DISCONTINUED | OUTPATIENT
Start: 2024-02-06 | End: 2024-02-06 | Stop reason: HOSPADM

## 2024-02-06 RX ORDER — ATROPINE SULFATE 0.4 MG/ML
0.5 INJECTION, SOLUTION INTRAVENOUS
Status: DISCONTINUED | OUTPATIENT
Start: 2024-02-06 | End: 2024-02-06 | Stop reason: HOSPADM

## 2024-02-06 RX ORDER — ACETAMINOPHEN 325 MG/1
650 TABLET ORAL EVERY 4 HOURS PRN
Status: DISCONTINUED | OUTPATIENT
Start: 2024-02-06 | End: 2024-02-06 | Stop reason: HOSPADM

## 2024-02-06 RX ADMIN — SODIUM CHLORIDE: 9 INJECTION, SOLUTION INTRAVENOUS at 10:42

## 2024-02-06 NOTE — PROGRESS NOTES
Returned to 2E05.  Monitor attached showing SR.  Vasc-band in place to right wrist.  Hemostasis maintained, no bleeding, swelling, or edema noted.  0.9NSS infusing with approx 700 ml remaining

## 2024-02-06 NOTE — DISCHARGE INSTRUCTIONS
Cover site with dressing or bandaid for 5 days and change daily. Dressing from hospital  should be left intact until next morning. No lotions, creams or powder to site. Minimize movement of wrist for 24 hours, may use armboard. No lifting over 5 pounds with involved extremity for 3 days. Do not immerse arm in water for 5 days, example bathtub, hot tub, swimming pool. Call physician for any signs bleeding, swelling, pain, redness, coolness of extremity. If bleeding or swelling occurs apply firm direct pressure to site for 15 minutes and call 911/ physician.

## 2024-02-06 NOTE — H&P
results available.   Comment:    [x]Previous sedation/anesthesia experiences assessed.   Comment:    [x]The patient is an appropriate candidate to undergo the planned procedure sedation and anesthesia. (Refer to nursing sedation/analgesia documentation record)  [x]Formulation and discussion of sedation/procedure plan, risks, and expectations with patient and/or responsible adult completed.  [x]Patient examined immediately prior to the procedure. (Refer to nursing sedation/analgesia documentation record)    Gildardo Kaba MD MD   Electronically signed 2/6/2024 at 12:53 PM

## 2024-02-06 NOTE — PROGRESS NOTES
Patient admitted to 2E  Ambulatory for cardiac cath.  Patient NPO. Patient accompanied by family.  Vital signs obtained.   Assessment and data collection intiated.   Oriented to room.  Policies and procedures for 2E explained.   All questions answered with no further questions at this time.   Fall prevention and safety precautions discussed with patient.

## 2024-06-27 ENCOUNTER — HOSPITAL ENCOUNTER (OUTPATIENT)
Dept: ULTRASOUND IMAGING | Age: 49
Discharge: HOME | End: 2024-06-27
Payer: COMMERCIAL

## 2024-06-27 DIAGNOSIS — N50.811 RIGHT TESTICULAR PAIN: ICD-10-CM

## 2024-06-27 PROCEDURE — 76870 US EXAM SCROTUM: CPT

## 2024-07-29 ENCOUNTER — OFFICE VISIT (OUTPATIENT)
Dept: UROLOGY | Age: 49
End: 2024-07-29
Payer: COMMERCIAL

## 2024-07-29 VITALS — RESPIRATION RATE: 16 BRPM | BODY MASS INDEX: 41.75 KG/M2 | WEIGHT: 315 LBS | HEIGHT: 73 IN

## 2024-07-29 DIAGNOSIS — N50.811 RIGHT TESTICULAR PAIN: Primary | ICD-10-CM

## 2024-07-29 PROCEDURE — 99203 OFFICE O/P NEW LOW 30 MIN: CPT | Performed by: UROLOGY

## 2024-07-29 RX ORDER — IBUPROFEN 800 MG/1
800 TABLET ORAL
Qty: 270 TABLET | Refills: 1 | Status: SHIPPED | OUTPATIENT
Start: 2024-07-29

## 2024-07-29 NOTE — PROGRESS NOTES
Juan Daniel De Leon MD.    [facility] [place] [facility]  [facility] UROLOGY  [address]  [address]  [facility] [address]  Dept: [phone]  Dept Fax: [phone]  Loc: [phone]    [facility] Urology Office Note -     Patient:  Jorge Lama  YOB: 1975    The patient is a 49 y.o. male who presents today for evaluation of the following problems:   Chief Complaint   Patient presents with    New Patient     Right side testicle pain and a pulling testicle to the side pain.    2018 u/s done couldn't find anything, then they found something but can't remember what it was.    referred/consultation requested by Michel Sandhu MD.    History of Present Illness:    Right testicular pain  Intermittent  Had pain like this a few years ago  Does a lot of heavy lifting  Has chronic back pain      Requested/reviewed records from Michel Sandhu MD office and/or outside physician/EMR    (Patient's old records have been requested, reviewed and pertinent findings summarized in today's note.)    Procedures Today: N/A      Last several PSA's:  No results found for: \"PSA\"    Last total testosterone:  No results found for: \"TESTOSTERONE\"    Urinalysis today:  No results found for this visit on 07/29/24.    Last BUN and creatinine:  Lab Results   Component Value Date    BUN 17 02/06/2024     Lab Results   Component Value Date    CREATININE 1.0 02/06/2024         Imaging Reviewed during this Office Visit:   JUAN DANIEL DE LEON MD independently reviewed the images and verified the radiology reports from:    US SCROTUM AND TESTICLES    Result Date: 6/27/2024  PROCEDURE: US SCROTUM AND TESTICLES CLINICAL INFORMATION: Right testicular pain TECHNIQUE: Ultrasound of the scrotum was performed. Grayscale and color Doppler images with spectral analysis were obtained. COMPARISON: Scrotal ultrasound 10/2/2018 FINDINGS: B-mode study: The right testicle measures 5.0 x 2.9 x 2.4 cm and the left testicle

## 2024-11-26 ENCOUNTER — APPOINTMENT (OUTPATIENT)
Dept: GENERAL RADIOLOGY | Age: 49
End: 2024-11-26
Payer: COMMERCIAL

## 2024-11-26 ENCOUNTER — HOSPITAL ENCOUNTER (EMERGENCY)
Age: 49
Discharge: HOME | End: 2024-11-26
Payer: COMMERCIAL

## 2024-11-26 ENCOUNTER — APPOINTMENT (OUTPATIENT)
Age: 49
End: 2024-11-26
Payer: COMMERCIAL

## 2024-11-26 VITALS
SYSTOLIC BLOOD PRESSURE: 117 MMHG | HEART RATE: 61 BPM | BODY MASS INDEX: 41.75 KG/M2 | WEIGHT: 315 LBS | RESPIRATION RATE: 17 BRPM | HEIGHT: 73 IN | TEMPERATURE: 98.7 F | OXYGEN SATURATION: 98 % | DIASTOLIC BLOOD PRESSURE: 84 MMHG

## 2024-11-26 DIAGNOSIS — R00.2 PALPITATIONS: Primary | ICD-10-CM

## 2024-11-26 LAB
ANION GAP SERPL CALC-SCNC: 9 MEQ/L (ref 8–16)
BASOPHILS ABSOLUTE: 0 THOU/MM3 (ref 0–0.1)
BASOPHILS NFR BLD AUTO: 0.7 %
BUN SERPL-MCNC: 16 MG/DL (ref 7–22)
CALCIUM SERPL-MCNC: 9.9 MG/DL (ref 8.5–10.5)
CHLORIDE SERPL-SCNC: 105 MEQ/L (ref 98–111)
CO2 SERPL-SCNC: 30 MEQ/L (ref 23–33)
CREAT SERPL-MCNC: 1 MG/DL (ref 0.4–1.2)
D DIMER PPP IA.FEU-MCNC: 292 NG/ML FEU (ref 0–500)
DEPRECATED RDW RBC AUTO: 46.2 FL (ref 35–45)
ECHO BSA: 2.82 M2
EKG ATRIAL RATE: 76 BPM
EKG P AXIS: 59 DEGREES
EKG P-R INTERVAL: 172 MS
EKG Q-T INTERVAL: 368 MS
EKG QRS DURATION: 92 MS
EKG QTC CALCULATION (BAZETT): 414 MS
EKG R AXIS: 62 DEGREES
EKG T AXIS: 44 DEGREES
EKG VENTRICULAR RATE: 76 BPM
EOSINOPHIL NFR BLD AUTO: 4.6 %
EOSINOPHILS ABSOLUTE: 0.3 THOU/MM3 (ref 0–0.4)
ERYTHROCYTE [DISTWIDTH] IN BLOOD BY AUTOMATED COUNT: 13.1 % (ref 11.5–14.5)
GFR SERPL CREATININE-BSD FRML MDRD: > 90 ML/MIN/1.73M2
GLUCOSE SERPL-MCNC: 118 MG/DL (ref 70–108)
HCT VFR BLD AUTO: 49.9 % (ref 42–52)
HGB BLD-MCNC: 16.9 GM/DL (ref 14–18)
IMM GRANULOCYTES # BLD AUTO: 0.02 THOU/MM3 (ref 0–0.07)
IMM GRANULOCYTES NFR BLD AUTO: 0.3 %
LYMPHOCYTES ABSOLUTE: 1.8 THOU/MM3 (ref 1–4.8)
LYMPHOCYTES NFR BLD AUTO: 30.7 %
MAGNESIUM SERPL-MCNC: 1.9 MG/DL (ref 1.6–2.4)
MCH RBC QN AUTO: 32.6 PG (ref 26–33)
MCHC RBC AUTO-ENTMCNC: 33.9 GM/DL (ref 32.2–35.5)
MCV RBC AUTO: 96.3 FL (ref 80–94)
MONOCYTES ABSOLUTE: 0.7 THOU/MM3 (ref 0.4–1.3)
MONOCYTES NFR BLD AUTO: 12 %
NEUTROPHILS ABSOLUTE: 3.1 THOU/MM3 (ref 1.8–7.7)
NEUTROPHILS NFR BLD AUTO: 51.7 %
NRBC BLD AUTO-RTO: 0 /100 WBC
NT-PROBNP SERPL IA-MCNC: 58.6 PG/ML (ref 0–124)
OSMOLALITY SERPL CALC.SUM OF ELEC: 289.1 MOSMOL/KG (ref 275–300)
PLATELET # BLD AUTO: 262 THOU/MM3 (ref 130–400)
PMV BLD AUTO: 10.1 FL (ref 9.4–12.4)
POTASSIUM SERPL-SCNC: 4.5 MEQ/L (ref 3.5–5.2)
RBC # BLD AUTO: 5.18 MILL/MM3 (ref 4.7–6.1)
SODIUM SERPL-SCNC: 144 MEQ/L (ref 135–145)
TROPONIN, HIGH SENSITIVITY: 11 NG/L (ref 0–12)
TSH SERPL DL<=0.005 MIU/L-ACNC: 2.03 UIU/ML (ref 0.4–4.2)
WBC # BLD AUTO: 5.9 THOU/MM3 (ref 4.8–10.8)

## 2024-11-26 PROCEDURE — 99285 EMERGENCY DEPT VISIT HI MDM: CPT

## 2024-11-26 PROCEDURE — 71046 X-RAY EXAM CHEST 2 VIEWS: CPT

## 2024-11-26 PROCEDURE — 93010 ELECTROCARDIOGRAM REPORT: CPT | Performed by: INTERNAL MEDICINE

## 2024-11-26 PROCEDURE — 84484 ASSAY OF TROPONIN QUANT: CPT

## 2024-11-26 PROCEDURE — 84443 ASSAY THYROID STIM HORMONE: CPT

## 2024-11-26 PROCEDURE — 93005 ELECTROCARDIOGRAM TRACING: CPT | Performed by: PHYSICIAN ASSISTANT

## 2024-11-26 PROCEDURE — 85025 COMPLETE CBC W/AUTO DIFF WBC: CPT

## 2024-11-26 PROCEDURE — 36415 COLL VENOUS BLD VENIPUNCTURE: CPT

## 2024-11-26 PROCEDURE — 80048 BASIC METABOLIC PNL TOTAL CA: CPT

## 2024-11-26 PROCEDURE — 83880 ASSAY OF NATRIURETIC PEPTIDE: CPT

## 2024-11-26 PROCEDURE — 83735 ASSAY OF MAGNESIUM: CPT

## 2024-11-26 PROCEDURE — 93242 EXT ECG>48HR<7D RECORDING: CPT

## 2024-11-26 PROCEDURE — 85379 FIBRIN DEGRADATION QUANT: CPT

## 2024-11-26 ASSESSMENT — PAIN - FUNCTIONAL ASSESSMENT: PAIN_FUNCTIONAL_ASSESSMENT: NONE - DENIES PAIN

## 2024-11-26 NOTE — ED PROVIDER NOTES
[facility] EMERGENCY DEPT      EMERGENCY MEDICINE     Pt Name: Jorge Lama  MRN: [id number]  Birthdate 1975  Date of evaluation: 11/26/2024  Provider: ALEJA Paniagua    CHIEF COMPLAINT       Chief Complaint   Patient presents with    Atrial Fibrillation     HISTORY OF PRESENT ILLNESS   Jorge Lama is a pleasant 49 y.o. male who presents to the emergency department from from home, by private vehicle for evaluation of complains of feeling his heart skipping beats.  He states that center for years but is worse in the last few days.  The patient has a history of atrial fibrillation and had an ablation.  He is maintained on diltiazem and aspirin he is followed by Dr. Kaba.  He denies any chest pain or shortness of breath.  He is otherwise without complaints..        PASTMEDICAL HISTORY     Past Medical History:   Diagnosis Date    Arthritis     Atrial fibrillation (HCC)     CAD (coronary artery disease)     Chest pain     Heart palpitations     Hypertension     Nausea & vomiting     PONV (postoperative nausea and vomiting)     Sleep apnea     wears cpap       Patient Active Problem List   Diagnosis Code    Heart palpitations R00.2    Atypical chest pain R07.89    Snoring R06.83    Sleep disturbance G47.9    Witnessed apneic spells R06.81    Morning headache R51.9    Non-restorative sleep G47.8    Restless sleeper G47.9    Obesity (BMI 30-39.9) E66.9    Hx of partial seizures Z86.69    PAF (paroxysmal atrial fibrillation) (HCC) I48.0    Biliary colic K80.50    Abnormal EKG R94.31     SURGICAL HISTORY       Past Surgical History:   Procedure Laterality Date    CARDIAC PROCEDURE N/A 2/6/2024    Left heart cath / coronary angiography performed by Gildardo Kaba MD at [facility] CARDIAC CATH LAB    CARDIAC SURGERY  1/14    ABLATION AT [facility]    FOOT SURGERY Right 2012    SC LAPS SURG CHOLECYSTECTOMY W/CHOLANGIOGRAPHY N/A 10/15/2018    LAP CHOLECYSTECTOMY performed by Rajinder Stevenson MD at [facility] OR  Oriented - self; Oriented - place; Oriented - time

## 2024-11-26 NOTE — ED TRIAGE NOTES
Pt presents to ED via lobby for evaluation of afib. Pt states hx of afib, and feels like \"his heart is skipping beats.\" Pt states this kept him up most of the night. Denies pain, denies CP, denies SOB. Vitals, labs, EKG obtained.

## 2024-11-26 NOTE — ED NOTES
ED nurse-to-nurse bedside report    Chief Complaint   Patient presents with    Atrial Fibrillation      LOC: alert and orientated to name, place, date  Vital signs   Vitals:    11/26/24 0653   BP: (!) 148/100   Pulse: 74   Resp: 18   Temp: 98.7 °F (37.1 °C)   TempSrc: Oral   SpO2: 96%   Weight: (!) 154.2 kg (340 lb)   Height: 1.854 m (6' 1\")      Pain:    Pain Interventions: none  Pain Goal: 0/10  Oxygen: No    Current needs required none   Telemetry: Yes  LDAs:   Peripheral IV 11/26/24 Right Antecubital (Active)   Site Assessment Clean, dry & intact 11/26/24 0655   Line Status Blood return noted;Flushed 11/26/24 0655     Continuous Infusions:   Mobility: Independent  Diaz Fall Risk Score:        No data to display              Fall Interventions: siderails, call light.   Report given to: Stephanie DIAZ.

## 2024-11-26 NOTE — ED NOTES
EKG notified of holter monitor order. States this will be approx. 1 hour before they can be here to apply it.

## 2024-12-08 LAB — ECHO BSA: 2.82 M2

## 2024-12-10 LAB — ECHO BSA: 2.82 M2

## 2024-12-30 RX ORDER — IBUPROFEN 800 MG/1
TABLET, FILM COATED ORAL
Qty: 270 TABLET | Refills: 1 | OUTPATIENT
Start: 2024-12-30

## 2025-03-25 ENCOUNTER — TELEPHONE (OUTPATIENT)
Dept: CARDIOLOGY CLINIC | Age: 50
End: 2025-03-25

## 2025-03-25 NOTE — TELEPHONE ENCOUNTER
Patient needs cardiac clearance for a left shoulder rotator cuff repair.  Surgery date is 4-15-25 at [facility] with Dr. Melissa.  Patient has not been seen since 1-30-24 by Dr. Kaba and canceled 6-27-24 appmt.  Unsure of where to put the patient on Dr. Kaba's schedule prior to surgery date.

## 2025-04-10 ENCOUNTER — OFFICE VISIT (OUTPATIENT)
Dept: CARDIOLOGY CLINIC | Age: 50
End: 2025-04-10
Payer: COMMERCIAL

## 2025-04-10 VITALS
SYSTOLIC BLOOD PRESSURE: 138 MMHG | BODY MASS INDEX: 41.75 KG/M2 | DIASTOLIC BLOOD PRESSURE: 82 MMHG | WEIGHT: 315 LBS | HEIGHT: 73 IN | HEART RATE: 80 BPM

## 2025-04-10 DIAGNOSIS — I48.0 PAROXYSMAL ATRIAL FIBRILLATION (HCC): ICD-10-CM

## 2025-04-10 DIAGNOSIS — Z01.810 PREOPERATIVE CARDIOVASCULAR EXAMINATION: Primary | ICD-10-CM

## 2025-04-10 PROCEDURE — 99214 OFFICE O/P EST MOD 30 MIN: CPT | Performed by: INTERNAL MEDICINE

## 2025-04-10 NOTE — PATIENT INSTRUCTIONS
Your staff today were Kamilla  Your provider today was Dr. Kaba  Phone number: [phone]      You may receive a survey regarding the care you received during your visit.  Your input is valuable to us.  We encourage you to complete and return your survey.  We hope you will choose us in the future for your healthcare needs.

## 2025-04-10 NOTE — PROGRESS NOTES
Follow-up, needs cleared for shoulder surgery.   No concerns from patient.   
and wishes to proceed with the procedure.  No further cardiac testing prior to upcoming surgery.  Atrial fibrillation well-managed; no episodes detected during monitoring in 11/2024  No current blood thinners; only taking aspirin and Ozempic  No reported issues with chest pain, breathing, or mobility  No heart failure, kidney problems, or stroke since the last visit    Assessment & Plan       Discussed symptoms needing emergency care or those which require further medical attention.   Discussed diet/exercise/BP/weight loss/health lifestyle choices/lipids; the patient understands the goals and will try to comply.        Disposition:  prn           The patient (or guardian, if applicable) and other individuals in attendance with the patient were advised that Artificial Intelligence will be utilized during this visit to record, process the conversation to generate a clinical note, and support improvement of the AI technology. The patient (or guardian, if applicable) and other individuals in attendance at the appointment consented to the use of AI, including the recording.      Electronically signed by Gildardo Kaba MD   4/10/2025 at 8:28 AM EDT

## 2025-07-17 ENCOUNTER — PROCEDURE VISIT (OUTPATIENT)
Dept: NEUROLOGY | Age: 50
End: 2025-07-17

## 2025-07-17 DIAGNOSIS — G56.03 BILATERAL CARPAL TUNNEL SYNDROME: ICD-10-CM

## 2025-07-17 DIAGNOSIS — R20.0 BILATERAL HAND NUMBNESS: ICD-10-CM

## 2025-07-17 DIAGNOSIS — G56.22 ULNAR NEUROPATHY AT ELBOW, LEFT: Primary | ICD-10-CM

## 2025-07-17 DIAGNOSIS — M54.12 CERVICAL RADICULOPATHY: ICD-10-CM

## (undated) DEVICE — GENERAL LAPAROSCOPY PACK-LF: Brand: MEDLINE INDUSTRIES, INC.

## (undated) DEVICE — KIT ANGIO W/ AT P65 PREM HND CTRL FOR CNTRST DEL ANGIOTOUCH

## (undated) DEVICE — NEEDLE INSUF L120MM DIA2MM DISP FOR PNEUMOPERI ENDOPATH

## (undated) DEVICE — TROCAR ENDOSCP L100MM DIA11MM DIL TIP STBL SL DISP ENDOPATH

## (undated) DEVICE — SUTURE VCRL SZ 4-0 L27IN ABSRB UD L19MM FS-2 3/8 CIR REV J422H

## (undated) DEVICE — TIBURON NEONATAL DRAPE: Brand: CONVERTORS

## (undated) DEVICE — APPLIER CLP M L L11.4IN DIA10MM ENDOSCP ROT MULT FOR LIG

## (undated) DEVICE — AGENT HEMSTAT W2XL4IN OXIDIZED REGENERATED CELOS ABSRB SFT

## (undated) DEVICE — KIT,ANTI FOG,W/SPONGE & FLUID,SOFT PACK: Brand: MEDLINE

## (undated) DEVICE — EXCEL 10FT (3.05 M) INSUFFLATION TUBING SET WITH 0.1 MICRON FILTER: Brand: EXCEL

## (undated) DEVICE — GLOVE ORANGE PI 7 1/2   MSG9075

## (undated) DEVICE — GLOVE SURG SZ 65 THK91MIL LTX FREE SYN POLYISOPRENE

## (undated) DEVICE — GLOVE ORANGE PI 8   MSG9080

## (undated) DEVICE — TROCAR ENDOSCP SHFT L100MM DIA5MM DIL TIP ENDOPATH XCEL

## (undated) DEVICE — CATHETER ANGIO JR4 AD 5 FRX100 CM 25 CM PERFORMA

## (undated) DEVICE — UNIVERSAL MONOPOLAR LAPAROSCOPIC CABLE 10FT, 4MM PIN CONNECTOR: Brand: CONMED

## (undated) DEVICE — TROCAR ENDOSCP L100MM DIA11MM STBL SL BLDELSS DISP ENDOPATH

## (undated) DEVICE — CATHETER DIAG 5FR L100CM LUMN ID0.047IN JL3.5 CRV 0 SIDE H

## (undated) DEVICE — GUIDEWIRE VASC L260CM DIA0.035IN RAD 3MM J TIP L7CM PTFE

## (undated) DEVICE — GLIDESHEATH SLENDER NITINOL HYDROPHILIC COATED INTRODUCER SHEATH: Brand: GLIDESHEATH SLENDER

## (undated) DEVICE — BLADE CLIPPER GEN PURP NS

## (undated) DEVICE — SOLUTION IV 1000ML 0.9% SOD CHL PH 5 INJ USP VIAFLX PLAS

## (undated) DEVICE — CATHETER DIAG AD 5FR L110CM 145DEG COR GRY HYDRPHLC NYL

## (undated) DEVICE — 3M™ STERI-DRAPE™ INSTRUMENT POUCH 1018: Brand: STERI-DRAPE™

## (undated) DEVICE — SCOPE WARMER THAT PRE-WARMS MULTIPLE LAPAROSCOPES.: Brand: JOSNOE MEDICAL INC

## (undated) DEVICE — CORE TRUMPET FOR SINGLE SOLUTION BAG: Brand: CORE DYNAMICS

## (undated) DEVICE — CARDIAC CATH LAB PACK LF

## (undated) DEVICE — SOLUTION IV IRRIG WATER 1000ML POUR BRL 2F7114

## (undated) DEVICE — BAND COMPR L24CM REG CLR PLAS HEMSTAT EXT HK AND LOOP RETEN

## (undated) DEVICE — DRESSING TRNSPAR W5XL4.5IN FLM SHT SEMIPERMEABLE WIND

## (undated) DEVICE — GOWN,SIRUS,NON REINFRCD,LARGE,SET IN SL: Brand: MEDLINE